# Patient Record
Sex: MALE | Race: WHITE | NOT HISPANIC OR LATINO | Employment: OTHER | URBAN - METROPOLITAN AREA
[De-identification: names, ages, dates, MRNs, and addresses within clinical notes are randomized per-mention and may not be internally consistent; named-entity substitution may affect disease eponyms.]

---

## 2018-02-12 ENCOUNTER — CONVERSION ENCOUNTER (OUTPATIENT)
Dept: OTHER | Facility: HOSPITAL | Age: 83
End: 2018-02-12

## 2018-02-12 ENCOUNTER — OFFICE VISIT CONVERTED (OUTPATIENT)
Dept: CARDIOLOGY | Facility: CLINIC | Age: 83
End: 2018-02-12
Attending: SPECIALIST

## 2018-02-27 ENCOUNTER — OFFICE VISIT CONVERTED (OUTPATIENT)
Dept: CARDIOLOGY | Facility: CLINIC | Age: 83
End: 2018-02-27
Attending: SPECIALIST

## 2018-03-08 ENCOUNTER — CONVERSION ENCOUNTER (OUTPATIENT)
Dept: OTHER | Facility: HOSPITAL | Age: 83
End: 2018-03-08

## 2018-03-08 ENCOUNTER — OFFICE VISIT CONVERTED (OUTPATIENT)
Dept: CARDIOLOGY | Facility: CLINIC | Age: 83
End: 2018-03-08
Attending: SPECIALIST

## 2018-05-03 ENCOUNTER — CONVERSION ENCOUNTER (OUTPATIENT)
Dept: OTHER | Facility: HOSPITAL | Age: 83
End: 2018-05-03

## 2018-05-03 ENCOUNTER — OFFICE VISIT CONVERTED (OUTPATIENT)
Dept: CARDIOLOGY | Facility: CLINIC | Age: 83
End: 2018-05-03
Attending: SPECIALIST

## 2018-05-17 ENCOUNTER — OFFICE VISIT CONVERTED (OUTPATIENT)
Dept: CARDIOLOGY | Facility: CLINIC | Age: 83
End: 2018-05-17
Attending: SPECIALIST

## 2018-05-17 ENCOUNTER — CONVERSION ENCOUNTER (OUTPATIENT)
Dept: OTHER | Facility: HOSPITAL | Age: 83
End: 2018-05-17

## 2018-05-22 ENCOUNTER — OFFICE VISIT CONVERTED (OUTPATIENT)
Dept: GASTROENTEROLOGY | Facility: CLINIC | Age: 83
End: 2018-05-22
Attending: PHYSICIAN ASSISTANT

## 2018-08-27 ENCOUNTER — OFFICE VISIT CONVERTED (OUTPATIENT)
Dept: CARDIOLOGY | Facility: CLINIC | Age: 83
End: 2018-08-27
Attending: SPECIALIST

## 2018-10-12 ENCOUNTER — OFFICE VISIT CONVERTED (OUTPATIENT)
Dept: GASTROENTEROLOGY | Facility: CLINIC | Age: 83
End: 2018-10-12
Attending: PHYSICIAN ASSISTANT

## 2018-12-27 ENCOUNTER — OFFICE VISIT CONVERTED (OUTPATIENT)
Dept: CARDIOLOGY | Facility: CLINIC | Age: 83
End: 2018-12-27
Attending: SPECIALIST

## 2019-01-14 ENCOUNTER — OFFICE VISIT CONVERTED (OUTPATIENT)
Dept: GASTROENTEROLOGY | Facility: CLINIC | Age: 84
End: 2019-01-14
Attending: PHYSICIAN ASSISTANT

## 2019-02-21 ENCOUNTER — CONVERSION ENCOUNTER (OUTPATIENT)
Dept: OTHER | Facility: HOSPITAL | Age: 84
End: 2019-02-21

## 2019-02-21 ENCOUNTER — OFFICE VISIT CONVERTED (OUTPATIENT)
Dept: CARDIOLOGY | Facility: CLINIC | Age: 84
End: 2019-02-21
Attending: SPECIALIST

## 2019-05-20 ENCOUNTER — CONVERSION ENCOUNTER (OUTPATIENT)
Dept: OTHER | Facility: HOSPITAL | Age: 84
End: 2019-05-20

## 2019-05-20 ENCOUNTER — OFFICE VISIT CONVERTED (OUTPATIENT)
Dept: CARDIOLOGY | Facility: CLINIC | Age: 84
End: 2019-05-20
Attending: SPECIALIST

## 2019-07-16 ENCOUNTER — CONVERSION ENCOUNTER (OUTPATIENT)
Dept: CARDIOLOGY | Facility: CLINIC | Age: 84
End: 2019-07-16
Attending: PHYSICIAN ASSISTANT

## 2019-07-18 ENCOUNTER — HOSPITAL ENCOUNTER (OUTPATIENT)
Dept: CT IMAGING | Facility: HOSPITAL | Age: 84
Discharge: HOME | End: 2019-07-18
Attending: PHYSICIAN ASSISTANT

## 2019-11-04 ENCOUNTER — CONVERSION ENCOUNTER (OUTPATIENT)
Dept: OTHER | Facility: HOSPITAL | Age: 84
End: 2019-11-04

## 2019-11-04 ENCOUNTER — OFFICE VISIT CONVERTED (OUTPATIENT)
Dept: CARDIOLOGY | Facility: CLINIC | Age: 84
End: 2019-11-04
Attending: SPECIALIST

## 2020-05-04 ENCOUNTER — CONVERSION ENCOUNTER (OUTPATIENT)
Dept: CARDIOLOGY | Facility: CLINIC | Age: 85
End: 2020-05-04

## 2020-05-04 ENCOUNTER — OFFICE VISIT CONVERTED (OUTPATIENT)
Dept: CARDIOLOGY | Facility: CLINIC | Age: 85
End: 2020-05-04
Attending: SPECIALIST

## 2020-05-06 ENCOUNTER — CONVERSION ENCOUNTER (OUTPATIENT)
Dept: SURGERY | Facility: CLINIC | Age: 85
End: 2020-05-06

## 2020-05-06 ENCOUNTER — OFFICE VISIT CONVERTED (OUTPATIENT)
Dept: UROLOGY | Facility: CLINIC | Age: 85
End: 2020-05-06
Attending: UROLOGY

## 2020-05-11 ENCOUNTER — HOSPITAL ENCOUNTER (OUTPATIENT)
Dept: NUCLEAR MEDICINE | Facility: HOSPITAL | Age: 85
Discharge: HOME | End: 2020-05-11
Attending: UROLOGY

## 2020-05-21 ENCOUNTER — HOSPITAL ENCOUNTER (OUTPATIENT)
Dept: RADIATION ONCOLOGY | Facility: HOSPITAL | Age: 85
Setting detail: RECURRING SERIES
Discharge: HOME | End: 2020-08-19
Attending: RADIOLOGY

## 2020-08-21 ENCOUNTER — HOSPITAL ENCOUNTER (OUTPATIENT)
Dept: RADIATION ONCOLOGY | Facility: HOSPITAL | Age: 85
Setting detail: RECURRING SERIES
Discharge: STILL ADMITTED OR ADMINISTRATIVE | End: 2020-08-31
Attending: RADIOLOGY

## 2020-09-21 ENCOUNTER — OFFICE VISIT CONVERTED (OUTPATIENT)
Dept: CARDIOLOGY | Facility: CLINIC | Age: 85
End: 2020-09-21
Attending: SPECIALIST

## 2020-09-24 ENCOUNTER — CONVERSION ENCOUNTER (OUTPATIENT)
Dept: CARDIOLOGY | Facility: CLINIC | Age: 85
End: 2020-09-24
Attending: SPECIALIST

## 2020-10-21 ENCOUNTER — OFFICE VISIT CONVERTED (OUTPATIENT)
Dept: SURGERY | Facility: CLINIC | Age: 85
End: 2020-10-21
Attending: SURGERY

## 2020-10-26 ENCOUNTER — HOSPITAL ENCOUNTER (OUTPATIENT)
Dept: PREADMISSION TESTING | Facility: HOSPITAL | Age: 85
Discharge: HOME | End: 2020-10-26
Attending: SURGERY

## 2020-10-27 LAB — SARS-COV-2 RNA SPEC QL NAA+PROBE: NOT DETECTED

## 2020-10-30 ENCOUNTER — HOSPITAL ENCOUNTER (OUTPATIENT)
Dept: PERIOP | Facility: HOSPITAL | Age: 85
Setting detail: HOSPITAL OUTPATIENT SURGERY
Discharge: HOME | End: 2020-10-30
Attending: SURGERY

## 2020-10-30 LAB
ANION GAP SERPL CALC-SCNC: 21 MMOL/L (ref 8–19)
BASOPHILS # BLD AUTO: 0.03 10*3/UL (ref 0–0.2)
BASOPHILS NFR BLD AUTO: 0.3 % (ref 0–3)
BUN SERPL-MCNC: 64 MG/DL (ref 5–25)
BUN/CREAT SERPL: 10 {RATIO} (ref 6–20)
CALCIUM SERPL-MCNC: 9.8 MG/DL (ref 8.7–10.4)
CHLORIDE SERPL-SCNC: 94 MMOL/L (ref 99–111)
CONV ABS IMM GRAN: 0.07 10*3/UL (ref 0–0.2)
CONV ANISOCYTES: SLIGHT
CONV CO2: 26 MMOL/L (ref 22–32)
CONV HYPOCHROMIA IN BLOOD BY LIGHT MICROSCOPY: SLIGHT
CONV IMMATURE GRAN: 0.7 % (ref 0–1.8)
CREAT UR-MCNC: 6.56 MG/DL (ref 0.7–1.2)
DEPRECATED RDW RBC AUTO: 71 FL (ref 35.1–43.9)
EOSINOPHIL # BLD AUTO: 0.12 10*3/UL (ref 0–0.7)
EOSINOPHIL # BLD AUTO: 1.2 % (ref 0–7)
ERYTHROCYTE [DISTWIDTH] IN BLOOD BY AUTOMATED COUNT: 18.4 % (ref 11.6–14.4)
GFR SERPLBLD BASED ON 1.73 SQ M-ARVRAT: 7 ML/MIN/{1.73_M2}
GLUCOSE BLD-MCNC: 164 MG/DL (ref 70–99)
GLUCOSE SERPL-MCNC: 140 MG/DL (ref 70–99)
HCT VFR BLD AUTO: 33 % (ref 42–52)
HGB BLD-MCNC: 10.8 G/DL (ref 14–18)
LYMPHOCYTES # BLD AUTO: 1.01 10*3/UL (ref 1–5)
LYMPHOCYTES NFR BLD AUTO: 10.3 % (ref 20–45)
MACROCYTES BLD QL SMEAR: SLIGHT
MCH RBC QN AUTO: 35.1 PG (ref 27–31)
MCHC RBC AUTO-ENTMCNC: 32.7 G/DL (ref 33–37)
MCV RBC AUTO: 107.1 FL (ref 80–96)
MONOCYTES # BLD AUTO: 0.48 10*3/UL (ref 0.2–1.2)
MONOCYTES NFR BLD AUTO: 4.9 % (ref 3–10)
NEUTROPHILS # BLD AUTO: 8.07 10*3/UL (ref 2–8)
NEUTROPHILS NFR BLD AUTO: 82.6 % (ref 30–85)
NRBC CBCN: 0 % (ref 0–0.7)
OSMOLALITY SERPL CALC.SUM OF ELEC: 305 MOSM/KG (ref 273–304)
OVALOCYTES BLD QL SMEAR: SLIGHT
PLATELET # BLD AUTO: 240 10*3/UL (ref 130–400)
PMV BLD AUTO: 9.1 FL (ref 9.4–12.4)
POIKILOCYTOSIS BLD QL SMEAR: SLIGHT
POLYCHROMASIA BLD QL SMEAR: SLIGHT
POTASSIUM SERPL-SCNC: 4.4 MMOL/L (ref 3.5–5.3)
RBC # BLD AUTO: 3.08 10*6/UL (ref 4.7–6.1)
SODIUM SERPL-SCNC: 137 MMOL/L (ref 135–147)
WBC # BLD AUTO: 9.78 10*3/UL (ref 4.8–10.8)

## 2020-11-30 ENCOUNTER — OFFICE VISIT CONVERTED (OUTPATIENT)
Dept: UROLOGY | Facility: CLINIC | Age: 85
End: 2020-11-30
Attending: UROLOGY

## 2020-12-23 ENCOUNTER — HOSPITAL ENCOUNTER (OUTPATIENT)
Dept: RADIATION ONCOLOGY | Facility: HOSPITAL | Age: 85
Discharge: HOME | End: 2020-12-23
Attending: RADIOLOGY

## 2021-04-12 ENCOUNTER — CONVERSION ENCOUNTER (OUTPATIENT)
Dept: OTHER | Facility: HOSPITAL | Age: 86
End: 2021-04-12

## 2021-04-12 ENCOUNTER — OFFICE VISIT CONVERTED (OUTPATIENT)
Dept: CARDIOLOGY | Facility: CLINIC | Age: 86
End: 2021-04-12
Attending: SPECIALIST

## 2021-05-10 NOTE — PROCEDURES
Procedure Note      Patient Name: Cristhian Watts   Patient ID: [id number]   Sex: Male   YOB: 1935    Primary Care Provider: Dee NASCIMENTO   Referring Provider: Dee NASCIMENTO    Visit Date: September 24, 2020    Provider: Christian Ocasio MD   Location: [facility]   Location Address: [address] [place] [address]  [id number]   Location Phone: [phone]          FINAL REPORT   TRANSTHORACIC ECHOCARDIOGRAM REPORT    Diagnosis: Atrial Fibrillation and CHF (Congestive Heart Failure)   Height: 6'2 Weight: 213 B/P: 130/63 BSA: 2.24   Tech: W   MEASUREMENTS:  RVID (Diastole) : RVID. (NORMAL: 0.7 to 2.4 cm max)   LVID (Systole): 3 cm (Diastole): 4.4 cm . (NORMAL: 3.7 - 5.4 cm)   Posterior Wall Thickness (Diastole): 1.2 cm. (NORMAL: 0.8 - 1.1 cm)   Septal Thickness (Diastole): 1.2 cm. (NORMAL: 0.7 - 1.2 cm)   LAID (Systole): 4.3 cm. (NORMAL: 1.9 - 3.8 cm)   Aortic Root Diameter (Diastole): 3.1 cm. (NORMAL: 2.0 - 3.7 cm)   DOPPLER:  E/A ratio 2.2 (NORMAL 0.8-2.0)   DT: 171 msec (NORMAL 140-240 msec.)   IVRT 103 m/sec (NORMAL  m/sec.)   E/E': 14 (NORMAL <8 avg.)   COMMENTS:  The patient underwent 2-D, M-Mode, and Doppler examination, including pulse-wave, continuous-wave, and color-flow analysis; the study is technically adequate.   FINDINGS:  AORTIC VALVE: fibrocalcific.   MITRAL VALVE: fibrocalcific.   TRICUSPID VALVE: Normal.   PULMONIC VALVE: Not well visualized.   LEFT ATRIUM: Normal. No intracavitary masses or clots seen. LA volume index is 40 mL/m2.   AORTIC ROOT: Normal in size with adequate motion.   LEFT VENTRICLE: Normal left ventricular systolic function. Ejection fraction 60%.   RIGHT ATRIUM: Normal.   RIGHT VENTRICLE: Normal size and function.   PERICARDIUM: Unremarkable. No evidence of effusion.   INFERIOR VENA CAVA: Diameter is 1 cm.   DOPPLER: Doppler examination of the aortic, mitral, tricuspid, and pulmonary valves was  performed. Shows moderate mitral regurgitation and moderate tricuspid regurgitation. Estimated pulmonary artery systolic pressure by Doppler was around 43 mm.      CONCLUSION    1. Fibrocalcific mitral and aortic valves.  2. Normal left ventricle systolic function.  3. Moderate mitral regurgitation.  4. Moderate tricuspid regurgitation.       Christian Ocasio MD  DENISSE/wt                 Electronically Signed by: Tawny Feliz-, -Author on September 24, 2020 02:46:18 PM  Electronically Co-signed by: Christian Ocasio MD -Reviewer on September 28, 2020 09:25:36 AM

## 2021-05-10 NOTE — H&P
History and Physical      Patient Name: Cristhian Watts   Patient ID: [id number]   Sex: Male   YOB: 1935    Primary Care Provider: Dee NASCIMENTO   Referring Provider: Dave Escobedo MD    Visit Date: October 21, 2020    Provider: Justin Arthur MD   Location: [facility]   Location Address: [address] [place] [address]  [id number]   Location Phone: [phone]          Chief Complaint  · Outpatient History & Physical / Surgical Orders  · PD Cath consult      History Of Present Illness  Cristhian Watts is a 85 year old /White male who presents to the office today as a consult from Dave Escobedo MD. He presents today for evaluation for peritoneal dialysis catheter placement. The patient is a hemodialysis patient but he reports that he gets very tired and weak after hemodialysis and his nephrologist feels he might be a candidate for peritoneal dialysis. He reports he has had some beginning education on peritoneal dialysis at the [facility]. He has not had a lot of abdominal surgery; he just reports the only surgery he has had is a remote open appendectomy when he was about ten years ago. Otherwise, he is not having any other unexpected symptoms.       Past Medical History  Diabetes; Prostate cancer metastatic to bone; 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; SOB (shortness of breath); Thyroid disorder         Past Surgical History  Bone marrow biopsy; Colonoscopy; Cystoscopic hydrostatic distension of bladder with biopsy; EGD; Kidney biopsy         Medication List  allopurinol 100 mg oral tablet; amiodarone 200 mg oral tablet; calcium acetate 667 mg oral tablet; cetirizine 10 mg oral tablet; cyclosporine 100 mg oral capsule; diltiazem HCl 120 mg oral capsule,ext.rel 24h degradable; finasteride 5 mg oral tablet; furosemide 40 mg oral tablet; gabapentin 100 mg oral capsule; levothyroxine 100 mcg oral tablet; metolazone 5 mg oral tablet;  "metoprolol tartrate 25 mg oral tablet; montelukast 10 mg oral tablet; Multi Vitamin 9 mg iron/15 mL oral liquid; pantoprazole 40 mg oral tablet,delayed release (DR/EC); pravastatin 20 mg oral tablet; prednisone 2.5 mg oral tablet; Stool Softener 100 mg oral capsule; Symbicort 160-4.5 mcg/actuation inhalation HFA aerosol inhaler; Vitamin D3 5000 units oral         Allergy List  NO KNOWN DRUG ALLERGIES       Allergies Reconciled  Family Medical History  - No Family History of Colorectal Cancer         Social History  Alcohol (Current some day); Tobacco (Former)         Review of Systems  · Constitutional  o Denies  o : chills, excessive sweating, fatigue, fever, sycope/passing out, weight gain, weight loss  · Eyes  o Denies  o : changes in vision, blurry vision, double vision  · HENT  o Denies  o : loss of hearing, ringing in the ears, ear aches, sore throat, nasal congestion, sinus pain, nose bleeds, seasonal allergies  · Cardiovascular  o Denies  o : blood clots, swollen legs, anemia, easy burising or bleeding, transfusions  · Respiratory  o Denies  o : shortness of breath, dry cough, productive cough, pneumonia, COPD  · Gastrointestinal  o Denies  o : difficulty swallowing, reflux  · Genitourinary  o Denies  o : incontinence  · Neurologic  o Denies  o : headache, seizure, stroke, tremor, loss of balance, falls, dizziness/vertigo, difficulty with sleep, numbness/tingling/paresthesia , difficulty with coordination, difficulty with dexterity, weakness  · Musculoskeletal  o Denies  o : neck stiffness/pain, swollen lymph nodes, muscle aches, joint pain, weakness, spasms, sciatica, pain radiating in arm, pain radiating in leg, low back pain  · Endocrine  o Denies  o : diabetes, thyroid disorder  · Psychiatric  o Denies  o : anxiety, depression      Vitals  Date Time BP Position Site L\R Cuff Size HR RR TEMP (F) WT  HT  BMI kg/m2 BSA m2 O2 Sat FR L/min FiO2        10/21/2020 02:42 PM       14  176lbs 16oz 6'  2\" 22.73 " 2.05             Physical Examination  · Constitutional  o Appearance  o : well developed, well-nourished, alert and in no acute distress  · Head and Face  o Head  o :   § Inspection  § : no deformities or lesions  · Eyes  o Conjunctivae  o : clear  o Sclerae  o : clear  · Neck  o Inspection/Palpation  o : normal appearance, no masses or tenderness, trachea midline  · Respiratory  o Respiratory Effort  o : breathing unlabored  o Inspection of Chest  o : normal appearance, no retractions  · Cardiovascular  o Heart  o : regular rate and rhythm  · Gastrointestinal  o Abdominal Examination  o : abdomen is soft.  · Lymphatic  o Neck  o : no lymphadenopathy present  o Axilla  o : no lymphadenopathy present  o Groin  o : no lymphadenopathy present  · Skin and Subcutaneous Tissue  o General Inspection  o : no rashes present, no lesions present, no areas of discoloration  · Neurologic  o Cranial Nerves  o : grossly intact  o Sensation  o : grossly intact  o Gait and Station  o :   § Gait Screening  § : normal gait, able to stand without diffculty  o Cerebellar Function  o : no obvious abnormalities  · Psychiatric  o Judgement and Insight  o : judgment and insight intact  o Mood and Affect  o : mood normal, affect appropriate          Assessment  · Pre-Surgical Orders     V72.84  · Pre-op testing     V72.84/Z01.818  · End stage renal disease     585.6/N18.6       Patient who presents for evaluation for peritoneal dialysis.       Plan  · Orders  o General Surgery Order (GENOR) - V72.84, 585.6/N18.6 - 10/30/2020  o [facility] [place] Covid-19 Screening (87782) - V72.84/Z01.818 - 10/26/2020   [facility] drive thru on 10/26/20 at 245PM at [address]  · Medications  o Medications have been Reconciled  o Transition of Care or Provider Policy  · Instructions  o PLAN:   o Handouts Provided-Pre-Procedure Instructions including date and time and location of procedure.  o Surgical Facility: [facility]  o ****Patient  Status****  o Outpatient  o ********************  o RISK AND BENEFITS:  o Consent for surgery: Given these options, the patient has verbally expressed an understanding of the risks of surgery and finds these risks acceptable. We will proceed with surgery as soon as possible.  o Consult Anesthesia for any post-operative block, or any pain management procedure deemed necessary by the anestesiologist for adequate post-operative pain control.   o O.R. PREP: Per protocol  o IV: Per Anesthesia  o PLEASE SIGN PERMIT FOR: PERITONEAL DIALYSIS CATHETER PLACEMENT  o *__Kefzol 2 gram IV on call to OR.  o *___The above History and Physical Examination has been completed within 30 days of admission.  o Pre-Admission Testing Date: PAT PHONE: 10/26/20 at 1PM  o Electronically Identified Patient Education Materials Provided Electronically     We will attempt peritoneal dialysis catheter placement and see how he does with peritoneal dialysis. Risks, benefits, and alternatives were discussed with the patient extensively. All questions were answered. The patient voiced understanding and agrees to proceed with the above plan.             Electronically Signed by: Lenora Clark-, -Author on October 22, 2020 10:11:33 AM  Electronically Co-signed by: Justin Arthur MD -Reviewer on October 22, 2020 11:38:00 AM

## 2021-05-13 NOTE — PROGRESS NOTES
Progress Note      Patient Name: Cristhian Watts   Patient ID: [id number]   Sex: Male   YOB: 1935    Primary Care Provider: Dee NASCIMENTO   Referring Provider: Dee NASCIMENTO    Visit Date: May 4, 2020    Provider: Christian Ocasio MD   Location: [facility]   Location Address: [address] [place] [address]  [id number]   Location Phone: [phone]          Chief Complaint  · Atrial fibrillation  · Presence of permanent pacemaker  · Hypertension  · Prostate CA  · Bladder CA       History Of Present Illness  Cristhian Watts is an 84 year old /White male with a history of multiple problems; Paroxysmal atrial fibrillation; status post ablation; status post cardioversion; Presence of permanent pacemaker; presence of watchman device, not on anticoagulation because bleeds. Recently diagnosed to have prostate CA work up being done for that. No further palpitations. He feels better. No chest pain or palpitations. No PND or othopnea. Shortness of breath is stable.   Medications  Meds at present include: Levothyroxine 100 mcg qd; Pravastatin 20 mg qpm; Prednisone-10 mg qod; Cyclosporine 100 mg qam and 50 mg qpm; Furosemide 40 mg qam; Stool softener 100 mg bid; Carvedilol 25 mg bid; Iron Ferosul 325 mg 2 tablets qpm; Amiodarone 200 mg qd; Allopurinol 100 mg qam; ASA 81 mg bid; Cetirizine 10 mg qpm; Finasteride 5 mg qpm; Montelukast 10 mg qd; Tamsulosin 0.4 mg qpm; Pantoprazole Sod DR 40 mg qd; Prednisone 10 mg qod; Symbicort 2 puffs bid; Vit D3-5000 iu qam; Procrit 40,000 units sub Q-q week.   PAST MEDICAL HISTORY: positive for diabetes, hyperlipidemia and hypertension; positive for atrial fibrillation and permanent pacemaker.   FAMILY HISTORY: negative for diabetes and hypertension. Positive for heart disease.   PSYCHO/SOCIAL HISTORY: negative for depression; rarely drinks alcohol and previously quit smoking.       Review of  "Systems  · Cardiovascular  o Admits  o : shortness of breath   o Denies  o : chest pain or angina pectoris, swelling, palpitations  · Respiratory  o Denies  o : chronic or frequent cough,asthma or wheezing      Vitals  Date Time BP Position Site L\R Cuff Size HR RR TEMP (F) WT  HT  BMI kg/m2 BSA m2 O2 Sat HC       05/04/2020 12:42 /71 Sitting    67 - R   224lbs 2oz 6'  2\" 28.78 2.3     05/04/2020 12:42 /74 Sitting    65 - R   224lbs 2oz 6'  2\" 28.78 2.3           Physical Examination  · Constitutional  o Appearance  o : Alert and Oriented X3. The patient is obese.   · Respiratory  o Inspection of Chest  o : No chest wall deformities, moving equal  o Auscultation of Lungs  o : Good air entry with vesicular breath sounds  · Cardiovascular  o Heart  o :   § Auscultation of Heart  § : S1 and S2 are regular. No S3. No S4. No murmurs.  o Peripheral Vascular System  o :   § Extremities  § : Peripheral pulses were well felt. 1+ edema. No cyanosis.  · Gastrointestinal  o Abdominal Examination  o : No masses or tenderness noted.      Neurologic: mental status within normal limits. Alert and oriented x3.  Psychiatric: mood and effect appear normal.           Assessment     IMPRESSION/PLAN    1. Paroxysmal atrial fibrillation; status post ablation.  Presence of Watchman's device stable.   Continue current dose of Amiodarone and carvedilol.    2. Presence of permanent pacemaker. Pacemaker check with the  shows his pacemaker is functioning normally.     3. Hyperlipidemia. Continue current dose of Pravastatin.   4. Chronic kidney disease stage 3 stable being managed by Dr. Escobedo.   5. Chronic diastolic heart failure stable. Continue current dose of Lasix.  I asked him to take an extra 40 mg of Lasix for the next 3 days and see if this improves his pedal edema. I asked him to decrease his alt and fluid intake. Follow up with Dr. Escobedo for it.   6. Multiple myeloma managed by his PMD.  7. Essential " hypertension controlled. 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 Continue current dose of  Carvedilol 25 mg bid.      8. He will see me back in 6 months.      MD DENISSE Elena/wt       Plan  · Instructions  o This note was transcribed by Mya Feliz. DENISSE/wt  o The above service was transcribed by Mya Feliz on my behalf and I attest to the accuracy of the note. DENISSE            Electronically Signed by: Tawny Feliz-, -Author on May 11, 2020 03:22:18 PM  Electronically Co-signed by: Christian Ocasio MD -Reviewer on May 13, 2020 12:11:00 PM

## 2021-05-13 NOTE — PROGRESS NOTES
Progress Note      Patient Name: Cristhian Watts   Patient ID: [id number]   Sex: Male   YOB: 1935    Primary Care Provider: Dee NASCIMENTO   Referring Provider: Dee NASCIMENTO    Visit Date: May 6, 2020    Provider: Qian Astudillo MD   Location[facility]   Location Address: [address] [place] [address]  [id number]   Location Phone: [phone]          Chief Complaint  · f/u cysto TURBT      History Of Present Illness  The patient is a 84 year old /White male, who is a consultation from Dee NASCIMENTO, for evaluation of prostate biopsies done 04/21/2020.   The PSA at diagnosis was 132. The Alek's score is 4+5. The number of positive cores was unknown. The number of total cores was unknown.   Tumor was found in the at the bladder neck found during TURBT biopsies. Perineural invasion was not reported. Prostate volume by ultrasound, in grams, was not reported.   Prior imaging studies have been done. These include a CT scan and metastases were detected. There were lesions in the vertebral column and enlarged pelvic lymph nodes.        He had gross hematuria and difficulty emptying his bladder.  CT scan revealed a bladder mass and pelvic lymphadenopathy as well as two small vertebral sclerotic lesions.      I took him to the OR and a TURBT revealed a alek grade 9 prostate cancer.        ---------LUPRON INJECTION INFORMATION---------  Cristhian Watts will receive a Lupron Injection today.   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; SOB (shortness of breath); Thyroid disorder         Past Surgical History  Bone marrow biopsy; Colonoscopy; Cystoscopic hydrostatic distension of bladder with biopsy; EGD; Kidney biopsy         Medication List  allopurinol 300 mg oral tablet; amiodarone 200 mg oral tablet; carvedilol 12.5 mg oral  "tablet; carvedilol 25 mg oral tablet; cetirizine 10 mg oral tablet; cyclosporine 100 mg oral capsule; finasteride 5 mg oral tablet; furosemide 40 mg oral tablet; Iron (ferrous sulfate) 325 mg (65 mg iron) oral tablet; Lantus U-100 Insulin 100 unit/mL subcutaneous solution; levothyroxine 75 mcg oral tablet; montelukast 10 mg oral tablet; Novolog Flexpen U-100 Insulin 100 unit/mL subcutaneous insulin pen; pantoprazole 40 mg oral tablet,delayed release (DR/EC); pravastatin 10 mg oral tablet; prednisone 2.5 mg oral tablet; Procrit 40,000 unit/mL injection solution; Stool Softener 100 mg oral capsule; Symbicort 160-4.5 mcg/actuation inhalation HFA aerosol inhaler; Ventolin HFA 90 mcg/actuation inhalation HFA aerosol inhaler; Vitamin D3 5000 units oral         Allergy List  NO KNOWN DRUG ALLERGIES       Allergies Reconciled  Family Medical History  - No Family History of Colorectal Cancer         Social History  Alcohol (Current some day); Tobacco (Former)         Review of Systems  · Constitutional  o Denies  o : chills, fever  · Gastrointestinal  o Denies  o : nausea, vomiting      Vitals  Date Time BP Position Site L\R Cuff Size HR RR TEMP (F) WT  HT  BMI kg/m2 BSA m2 O2 Sat        05/06/2020 10:41 /58 Sitting      97.5 218lbs 0oz 6'  2\" 27.99 2.27           Physical Examination  · Constitutional  o Appearance  o : Well nourished, well developed patient in no acute distress. Ambulating without difficulty.  · Head and Face  o Head  o :   § Inspection  § : atraumatic, normocephalic  o Face  o :   § Inspection  § : no facial lesions  · Eyes  o Sclerae  o : sclerae white  · Ears, Nose, Mouth and Throat  o Ears  o :   § External Ears  § : appearance within normal limits, no lesions present  o Nose  o :   § External Nose  § : appearance normal  · Neck  o Inspection/Palpation  o : normal appearance, trachea midline  · Respiratory  o Respiratory Effort  o : breathing unlabored  · Gastrointestinal  o Abdominal " Examination  o : abdomen nontender to palpation, tone normal without rigidity or guarding, no masses present, abdominal contour scaphoid  · Skin and Subcutaneous Tissue  o General Inspection  o : No rashes, lesions or areas of discoloration present. Skin turgor is normal.  · Neurologic  o Mental Status Examination  o :   § Orientation  § : grossly oriented to person, place and time  § Speech/Language  § : communication ability within normal limits  o Gait and Station  o : normal gait, able to stand without difficulty  · Psychiatric  o Judgement and Insight  o : judgment and insight intact, judgement for everyday activities and social situations within normal limits, insight intact  o Mood and Affect  o : mood normal, affect appropriate          Results  · In-Office Procedures  o Lab procedure  § Automated dipstick urinalysis with microscopy (32427)   § Color Ur: Yellow   § Clarity Ur: Clear   § Glucose Ur Ql Strip: Negative   § Bilirub Ur Ql Strip: Negative   § Ketones Ur Ql Strip: Negative   § Sp Gr Ur Qn: 1.015   § Hgb Ur Ql Strip: Large   § pH Ur-LsCnc: 5.5   § Prot Ur Ql Strip: 30   § Urobilinogen Ur Strip-mCnc: 0.2   § Nitrite Ur Ql Strip: Negative   § WBC Est Ur Ql Strip: Small   § RBC UrnS Qn HPF: tntc   § WBC UrnS Qn HPF: 0   § Bacteria UrnS Qn HPF: 0   § Crystals UrnS Qn HPF: 0   § Epithelial Cells (non renal): 0 /HPF  § Epithelial Cells (renal): 0       Assessment  · Prostate Cancer     185/C61  · 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       Malignant neoplasm of prostate     185/C61  · Prostate cancer metastatic to bone       Malignant neoplasm of prostate     185/C61  Secondary malignant neoplasm of bone     185/C79.51      Plan  · Orders  o Bone Scan (Whole Body) (96382) - 185/C61 - 05/11/2020  o Lupron 45 mg [facility] (-3) - 185/C61 - 05/06/2020   Injection - Lupron 45 mg; Dose: 45mg; Site: Not Entered; Route: intramuscular; Date: 05/06/2020 12:04:28; Exp: 06/01/2022; Lot: [id number]; Mfg: [employer]; TradeName: Lupron Depot (6 Month); Location: [facility]; Administered By: Valorie Flowers; Comment:   o Administration of hormonal anti-neoplastic chemotherapy (59099) - 185/C61 - 05/06/2020  · Medications  o Medications have been Reconciled  o Transition of Care or Provider Policy  · Instructions  o DISCUSSION:  o The patient has prostatic cancer by recent biopsy. I have had a long discussion regarding the options including observation, surgery, radiation, cryotherapy and hormonal therapies. Risks and benefits of each were explained fully and questions were answered. Due to his advanced age and advanced disease, he will need hormone therapy. A Lupron shot was given today. He will have an appt with Dr. Thakkar and Dr. Adrian. He is not a candidate for surgery.   o I recommend hormone therapy as his best option. The risks of hot flashes and fatigue were discussed. He understands that hormone therapy does not cure the cancer, but it usually slows its growth.  o FOLLOW-UP: by phone in one month  o Electronically Identified Patient Education Materials Provided Electronically            Electronically Signed by: Qian Astudillo MD -Author on May 6, 2020 12:26:34 PM

## 2021-05-13 NOTE — PROGRESS NOTES
Progress Note      Patient Name: Cristhian Watts   Patient ID: [id number]   Sex: Male   YOB: 1935    Primary Care Provider: Dee NASCIMENTO   Referring Provider: Dee NASCIMENTO    Visit Date: September 21, 2020    Provider: Christian Ocasio MD   Location: [facility]   Location Address: [address] [place] [address]  [id number]   Location Phone: [phone]          Chief Complaint  · Atrial fibrillation  · Presence of permanent pacemaker  · Hypertension  · Prostate CA  · Bladder CA       History Of Present Illness  Cristhian Watts is an 85 year old /White male with a history of multiple problems; Paroxysmal atrial fibrillation; status post ablation. He was recently in the hospital with increased heart rate has been started on Cardizem. No chest pain or palpitations. No PND or othopnea. Shortness of breath is stable.   Medications  Meds at present include: Levothyroxine 100 mcg qd; Pravastatin 20 mg qpm; Prednisone-10 mg qod; Cyclosporine 100 mg qam and 50 mg qpm; Furosemide 40 mg 2 tablets bid; Stool softener 100 mg bid; Amiodarone 200 mg bid; Allopurinol 100 mg qam; Cetirizine 10 mg qpm; Finasteride 5 mg qpm; Montelukast 10 mg qd; Pantoprazole Sod DR 40 mg qd; Prednisone 10 mg qod; Symbicort 2 puffs bid; Vit D3-5000 iu qam; Diltiazem 24 HR ER  mg qam; Gabapentin 100 mg qhs; Zaroxolyn 5 mg qd; Metoprolol Tartrate 25 mg 1/2 tablet bid; No insulin under 200.   PAST MEDICAL HISTORY: positive for diabetes, hyperlipidemia and hypertension; positive for atrial fibrillation and permanent pacemaker.   FAMILY HISTORY: negative for diabetes, hypertension and heart disease.   PSYCHO/SOCIAL HISTORY: negative for depression; rarely drinks alcohol and previously quit smoking.       Review of Systems  · Cardiovascular  o Admits  o : shortness of breath   o Denies  o : chest pain or angina pectoris, swelling,  "palpitations  · Respiratory  o Denies  o : chronic or frequent cough,asthma or wheezing      Vitals  Date Time BP Position Site L\R Cuff Size HR RR TEMP (F) WT  HT  BMI kg/m2 BSA m2 O2 Sat HC       09/21/2020 01:32 /63 Sitting    68 - R   213lbs 8oz 6'  2\" 27.41 2.25           Physical Examination  · Constitutional  o Appearance  o : Alert and Oriented X3. The patient is obese.   · Respiratory  o Inspection of Chest  o : No chest wall deformities, moving equal  o Auscultation of Lungs  o : Good air entry with vesicular breath sounds  · Cardiovascular  o Heart  o :   § Auscultation of Heart  § : S1 and S2 are regular. No S3. No S4. No murmurs.  o Peripheral Vascular System  o :   § Extremities  § : Peripheral pulses were well felt. 1+ edema. No cyanosis.  · Gastrointestinal  o Abdominal Examination  o : No masses or tenderness noted.      I reviewed his EKG and other reports from [facility]           Assessment     IMPRESSION/PLAN    1. Paroxysmal atrial fibrillation; status post ablation. I agree with starting Cardizem.  Continue current dose of Amiodarone and Carvedilol.    2. Presence of permanent pacemaker. Pacemaker  was checked with the  recently shows his pacemaker is functioning normally.   Repeat echocardiogram to evaluate left ventricle systolic function.   3. Hyperlipidemia. Continue current dose of Pravastatin.   4. Chronic kidney disease stage 3 stable being managed by Dr. Escobedo.   5. Essential hypertension controlled. 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 Continue current dose of Metoprolol.       6. He will see me back in 6 months.      Christian Ocasio MD  DENISSE/wt       Plan  · Medications  o Medications have been Reconciled  o Transition of Care or Provider Policy  · Instructions  o This note was transcribed by Mya Feliz. DENISSE/wt  o The above service was transcribed by Mya Feliz on my behalf and I attest to the accuracy of the note. " DENISSE            Electronically Signed by: Tawny Feliz-, -Author on September 25, 2020 07:50:30 AM  Electronically Co-signed by: Christian Ocasio MD -Reviewer on September 28, 2020 09:25:12 AM

## 2021-05-13 NOTE — PROGRESS NOTES
Progress Note      Patient Name: Cristhian Watts   Patient ID: [id number]   Sex: Male   YOB: 1935    Primary Care Provider: Dee NASCIMENTO   Referring Provider: Dave Escobedo MD    Visit Date: November 30, 2020    Provider: Qian Astudillo MD   Location: [facility]   Location Address: [address] [place] [address]  [id number]   Location Phone: [phone]          Chief Complaint  · pt is here for urological concerns      History Of Present Illness  The patient is a 85 year old /White male, who is a consultation from Dee NASCIMENTO, for evaluation of prostate biopsies done 04/21/2020.   The PSA at diagnosis was 132. The Alek's score is 4+5. The number of positive cores was unknown. The number of total cores was unknown.   Tumor was found in the at the bladder neck found during TURBT biopsies. Perineural invasion was not reported. Prostate volume by ultrasound, in grams, was not reported.   Prior imaging studies have been done. These include a CT scan and metastases were detected. There were lesions in the vertebral column and enlarged pelvic lymph nodes.        He had gross hematuria and difficulty emptying his bladder.  CT scan revealed a bladder mass and pelvic lymphadenopathy as well as two small vertebral sclerotic lesions.      I took him to the OR and a TURBT revealed a alek grade 9 prostate cancer.     He has been on hormone therapy and is having no issues.        ---------LUPRON INJECTION INFORMATION---------  Cristhian Watts will receive a Lupron Injection today.   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; SOB (shortness of breath); Thyroid disorder         Past Surgical History  Bone marrow biopsy; Colonoscopy; Cystoscopic hydrostatic distension of bladder with biopsy; EGD; Kidney biopsy  "        Medication List  allopurinol 100 mg oral tablet; amiodarone 200 mg oral tablet; calcium acetate 667 mg oral tablet; cetirizine 10 mg oral tablet; cyclosporine 100 mg oral capsule; diltiazem HCl 120 mg oral capsule,ext.rel 24h degradable; finasteride 5 mg oral tablet; furosemide 40 mg oral tablet; gabapentin 100 mg oral capsule; levothyroxine 100 mcg oral tablet; Lupron Depot (6 Month) 45 mg intramuscular syringe kit; metolazone 5 mg oral tablet; metoprolol tartrate 25 mg oral tablet; montelukast 10 mg oral tablet; Multi Vitamin 9 mg iron/15 mL oral liquid; pantoprazole 40 mg oral tablet,delayed release (DR/EC); pravastatin 20 mg oral tablet; prednisone 2.5 mg oral tablet; Stool Softener 100 mg oral capsule; Symbicort 160-4.5 mcg/actuation inhalation HFA aerosol inhaler; Vitamin D3 5000 units oral         Allergy List  NO KNOWN DRUG ALLERGIES       Allergies Reconciled  Family Medical History  - No Family History of Colorectal Cancer         Social History  Alcohol (Current some day); Tobacco (Former)         Review of Systems  · Constitutional  o Denies  o : chills, fever  · Gastrointestinal  o Denies  o : nausea, vomiting      Vitals  Date Time BP Position Site L\R Cuff Size HR RR TEMP (F) WT  HT  BMI kg/m2 BSA m2 O2 Sat FR L/min FiO2        11/30/2020 11:47 /51 Sitting       229lbs 0oz 6'  2\" 29.4 2.33             Physical Examination  · Constitutional  o Appearance  o : Well nourished, well developed patient in no acute distress. Ambulating without difficulty.  · Head and Face  o Head  o :   § Inspection  § : atraumatic, normocephalic  o Face  o :   § Inspection  § : no facial lesions  · Eyes  o Sclerae  o : sclerae white  · Ears, Nose, Mouth and Throat  o Ears  o :   § External Ears  § : appearance within normal limits, no lesions present  o Nose  o :   § External Nose  § : appearance normal  · Neck  o Inspection/Palpation  o : normal appearance, trachea midline  · Respiratory  o Respiratory " Effort  o : breathing unlabored  · Gastrointestinal  o Abdominal Examination  o : abdomen nontender to palpation, tone normal without rigidity or guarding, no masses present, abdominal contour scaphoid  · Neurologic  o Mental Status Examination  o :   § Orientation  § : grossly oriented to person, place and time  § Speech/Language  § : communication ability within normal limits  o Gait and Station  o : normal gait, able to stand without difficulty  · Psychiatric  o Judgement and Insight  o : judgment and insight intact, judgement for everyday activities and social situations within normal limits, insight intact  o Mood and Affect  o : mood normal, affect appropriate          Results  · In-Office Procedures  o Lab procedure  § Automated dipstick urinalysis with microscopy (88862)   § Color Ur: Yellow   § Clarity Ur: Clear   § Glucose Ur Ql Strip: Negative   § Bilirub Ur Ql Strip: Negative   § Ketones Ur Ql Strip: Negative   § Sp Gr Ur Qn: 1.020   § Hgb Ur Ql Strip: Trace-lysed   § pH Ur-LsCnc: 5.5   § Prot Ur Ql Strip: 100   § Urobilinogen Ur Strip-mCnc: 0.2   § Nitrite Ur Ql Strip: Negative   § WBC Est Ur Ql Strip: Trace   § RBC UrnS Qn HPF: 0   § WBC UrnS Qn HPF: 0   § Bacteria UrnS Qn HPF: 0   § Crystals UrnS Qn HPF: 0   § Epithelial Cells (non renal): 0 /HPF  § Epithelial Cells (renal): 0       Assessment  · Prostate Cancer     185/C61  · 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       Malignant neoplasm of prostate     185/C61  · Prostate cancer metastatic to bone       Malignant neoplasm of prostate     185/C61  Secondary malignant neoplasm of bone     185/C79.51      Plan  · Orders  o Administration of hormonal anti-neoplastic chemotherapy (49614) - 185/C61 - 11/30/2020  o 6.00 - Lupron 45 mg [facility] (-3) - 185/C61, 185/C79.51 - 11/30/2020   Lupron Lot No [id number], exp 03/20/2023  · Medications  o Medications have been Reconciled  o Transition of Care or Provider Policy  · Instructions  o DISCUSSION:  o The  patient has prostatic cancer by recent biopsy. I have had a long discussion regarding the options including observation, surgery, radiation, cryotherapy and hormonal therapies. Risks and benefits of each were explained fully and questions were answered. Due to his advanced age and advanced disease, he will need hormone therapy. A Lupron shot was given today. He will have an appt with Dr. Thakkar and Dr. Adrian. He is not a candidate for surgery.   o I recommend hormone therapy as his best option. The risks of hot flashes and fatigue were discussed. He understands that hormone therapy does not cure the cancer, but it usually slows its growth.  o FOLLOW-UP:  o In 6 months  o Please obtain labs for Lupron injection prior to the next office visit (injection).  o Electronically Identified Patient Education Materials Provided Electronically            Electronically Signed by: Qian Astudillo MD -Author on November 30, 2020 12:20:11 PM

## 2021-05-14 VITALS
WEIGHT: 239.12 LBS | SYSTOLIC BLOOD PRESSURE: 121 MMHG | DIASTOLIC BLOOD PRESSURE: 66 MMHG | HEIGHT: 74 IN | HEART RATE: 97 BPM | BODY MASS INDEX: 30.69 KG/M2

## 2021-05-14 VITALS — WEIGHT: 177 LBS | HEIGHT: 74 IN | BODY MASS INDEX: 22.72 KG/M2 | RESPIRATION RATE: 14 BRPM

## 2021-05-14 VITALS
BODY MASS INDEX: 27.4 KG/M2 | HEIGHT: 74 IN | DIASTOLIC BLOOD PRESSURE: 63 MMHG | SYSTOLIC BLOOD PRESSURE: 130 MMHG | WEIGHT: 213.5 LBS | HEART RATE: 68 BPM

## 2021-05-14 VITALS
WEIGHT: 229 LBS | HEIGHT: 74 IN | SYSTOLIC BLOOD PRESSURE: 135 MMHG | DIASTOLIC BLOOD PRESSURE: 51 MMHG | BODY MASS INDEX: 29.39 KG/M2

## 2021-05-14 NOTE — PROGRESS NOTES
Progress Note      Patient Name: Cristhian Watts   Patient ID: [id number]   Sex: Male   YOB: 1935    Primary Care Provider: Dee NASCIMENTO   Referring Provider: Dave Escobedo MD    Visit Date: April 12, 2021    Provider: Christian Ocasio MD   Location: [facility]   Location Address: [address] [place] [address]  [id number]   Location Phone: [phone]          Chief Complaint  · Atrial fibrillation  · Atrial fibrillation; history of ablation  · Presence of permanent pacemaker      History Of Present Illness  Cristhian Watts is an 85 year old /White male with a history of multiple medical problems; Paroxysmal atrial fibrillation; status post ablation. He was recently in the hospital with low blood pressures, Cardizem was decreased. Blood pressure is better now. No chest pain or palpitations. No PND or othopnea. Shortness of breath is stable.   Medications  Meds at present include: Levothyroxine 100 mcg qd; Calcitriol 0.5 mg in Amlodipine 3 times q week; Calcium Acetate 667 mg 2 capsules each meal and with snack; Montelukast 10 mg qd; Pantoprazole Sod DR 40 mg qd; Prednisone 10 mg qod; Symbicort 2 puffs bid; Vit D3-5000 iu qam; Diltiazem 24 HR ER  mg qam; Gabapentin 100 mg qhs; Metoprolol Tartrate 25 mg 1/2 tablet bid; No insulin under 200.   PAST MEDICAL HISTORY: positive for diabetes, hyperlipidemia and hypertension; positive for atrial fibrillation and permanent pacemaker.   FAMILY HISTORY: positive for diabetes and hypertension; Negative for heart disease.   PSYCHO/SOCIAL HISTORY: negative for depression; rarely drinks alcohol and previously quit smoking.       Review of Systems  · Cardiovascular  o Admits  o : shortness of breath   o Denies  o : chest pain or angina pectoris, swelling, palpitations  · Respiratory  o Denies  o : chronic or frequent cough,asthma or wheezing      Vitals  Date Time BP Position Site L\R Cuff Size  "HR RR TEMP (F) WT  HT  BMI kg/m2 BSA m2 O2 Sat FR L/min FiO2 HC       04/12/2021 11:05 /66 Sitting    97 - R   239lbs 2oz 6'  2\" 30.7 2.38             Physical Examination  · Constitutional  o Appearance  o : Alert and Oriented X3. The patient is obese.   · Respiratory  o Inspection of Chest  o : No chest wall deformities, moving equal  o Auscultation of Lungs  o : Good air entry with vesicular breath sounds  · Cardiovascular  o Heart  o :   § Auscultation of Heart  § : S1 and S2 are regular. No S3. No S4. No murmurs.  o Peripheral Vascular System  o :   § Extremities  § : Peripheral pulses were well felt. 1+ edema. No cyanosis.  · Gastrointestinal  o Abdominal Examination  o : No masses or tenderness noted.           Assessment     IMPRESSION/PLAN    1. Paroxysmal atrial fibrillation/atrial flutter; status post ablation. Continue Cardizem and Amiodarone. Continue current dose of lose dose Metoprolol.    2. Presence of permanent pacemaker. Pacemaker  was checked with the  shows his pacemaker is functioning normally, there is underlying atrial; flutter.     3. Chronic kidney disease stage 3 stable being managed by Dr. Escobedo.   4. Essential hypertension controlled.  Continue current dose of Metoprolol and Cardizem.   Monitor blood pressure regularly.     6. He will see me back in 6 months.      MD DENISSE Elena/wt       Plan  · Instructions  o This note was transcribed by Mya Feliz. DENISSE/wt  o The above service was transcribed by Mya Feliz on my behalf and I attest to the accuracy of the note. DENISSE            Electronically Signed by: Tawny Feliz-, -Author on April 15, 2021 10:25:09 AM  Electronically Co-signed by: Christian Ocasio MD -Reviewer on April 29, 2021 09:52:09 AM  "

## 2021-05-15 VITALS
DIASTOLIC BLOOD PRESSURE: 71 MMHG | HEIGHT: 74 IN | WEIGHT: 224.12 LBS | SYSTOLIC BLOOD PRESSURE: 151 MMHG | HEART RATE: 67 BPM | BODY MASS INDEX: 28.76 KG/M2

## 2021-05-15 VITALS
HEART RATE: 65 BPM | WEIGHT: 220 LBS | HEIGHT: 74 IN | BODY MASS INDEX: 28.23 KG/M2 | DIASTOLIC BLOOD PRESSURE: 62 MMHG | SYSTOLIC BLOOD PRESSURE: 173 MMHG

## 2021-05-15 VITALS
SYSTOLIC BLOOD PRESSURE: 126 MMHG | BODY MASS INDEX: 28.29 KG/M2 | OXYGEN SATURATION: 100 % | HEART RATE: 60 BPM | DIASTOLIC BLOOD PRESSURE: 57 MMHG | WEIGHT: 220.44 LBS | HEIGHT: 74 IN

## 2021-05-15 VITALS
HEART RATE: 68 BPM | HEIGHT: 74 IN | BODY MASS INDEX: 27.98 KG/M2 | WEIGHT: 218 LBS | DIASTOLIC BLOOD PRESSURE: 65 MMHG | OXYGEN SATURATION: 98 % | SYSTOLIC BLOOD PRESSURE: 149 MMHG

## 2021-05-15 VITALS
HEIGHT: 74 IN | WEIGHT: 220 LBS | DIASTOLIC BLOOD PRESSURE: 62 MMHG | HEART RATE: 65 BPM | BODY MASS INDEX: 28.23 KG/M2 | SYSTOLIC BLOOD PRESSURE: 173 MMHG

## 2021-05-15 VITALS
TEMPERATURE: 97.5 F | WEIGHT: 218 LBS | DIASTOLIC BLOOD PRESSURE: 58 MMHG | HEIGHT: 74 IN | SYSTOLIC BLOOD PRESSURE: 141 MMHG | BODY MASS INDEX: 27.98 KG/M2

## 2021-05-15 VITALS
HEIGHT: 74 IN | WEIGHT: 219.37 LBS | HEART RATE: 63 BPM | DIASTOLIC BLOOD PRESSURE: 67 MMHG | BODY MASS INDEX: 28.15 KG/M2 | SYSTOLIC BLOOD PRESSURE: 160 MMHG

## 2021-05-15 VITALS
DIASTOLIC BLOOD PRESSURE: 76 MMHG | HEIGHT: 74 IN | WEIGHT: 207.5 LBS | HEART RATE: 60 BPM | SYSTOLIC BLOOD PRESSURE: 147 MMHG | BODY MASS INDEX: 26.63 KG/M2

## 2021-05-15 VITALS
BODY MASS INDEX: 27.08 KG/M2 | SYSTOLIC BLOOD PRESSURE: 149 MMHG | HEIGHT: 74 IN | DIASTOLIC BLOOD PRESSURE: 68 MMHG | WEIGHT: 211 LBS | HEART RATE: 60 BPM

## 2021-05-16 VITALS
BODY MASS INDEX: 26.44 KG/M2 | HEIGHT: 74 IN | DIASTOLIC BLOOD PRESSURE: 69 MMHG | WEIGHT: 206 LBS | HEART RATE: 112 BPM | SYSTOLIC BLOOD PRESSURE: 120 MMHG

## 2021-05-16 VITALS
WEIGHT: 209.37 LBS | BODY MASS INDEX: 26.87 KG/M2 | DIASTOLIC BLOOD PRESSURE: 61 MMHG | SYSTOLIC BLOOD PRESSURE: 137 MMHG | HEIGHT: 74 IN

## 2021-05-16 VITALS
HEIGHT: 74 IN | SYSTOLIC BLOOD PRESSURE: 154 MMHG | HEART RATE: 64 BPM | WEIGHT: 211.12 LBS | BODY MASS INDEX: 27.09 KG/M2 | DIASTOLIC BLOOD PRESSURE: 69 MMHG

## 2021-05-16 VITALS
SYSTOLIC BLOOD PRESSURE: 99 MMHG | DIASTOLIC BLOOD PRESSURE: 56 MMHG | HEART RATE: 52 BPM | WEIGHT: 217.44 LBS | BODY MASS INDEX: 27.91 KG/M2 | HEIGHT: 74 IN

## 2021-05-16 VITALS
WEIGHT: 217.56 LBS | BODY MASS INDEX: 27.92 KG/M2 | RESPIRATION RATE: 12 BRPM | DIASTOLIC BLOOD PRESSURE: 59 MMHG | HEIGHT: 74 IN | SYSTOLIC BLOOD PRESSURE: 155 MMHG | OXYGEN SATURATION: 99 % | HEART RATE: 68 BPM

## 2021-05-16 VITALS
HEART RATE: 59 BPM | WEIGHT: 215 LBS | HEIGHT: 74 IN | SYSTOLIC BLOOD PRESSURE: 129 MMHG | BODY MASS INDEX: 27.59 KG/M2 | DIASTOLIC BLOOD PRESSURE: 64 MMHG

## 2021-05-16 VITALS
WEIGHT: 228 LBS | HEART RATE: 64 BPM | DIASTOLIC BLOOD PRESSURE: 56 MMHG | HEIGHT: 74 IN | SYSTOLIC BLOOD PRESSURE: 146 MMHG | BODY MASS INDEX: 29.26 KG/M2

## 2021-05-16 VITALS
BODY MASS INDEX: 28.11 KG/M2 | HEIGHT: 74 IN | DIASTOLIC BLOOD PRESSURE: 76 MMHG | HEART RATE: 64 BPM | SYSTOLIC BLOOD PRESSURE: 158 MMHG | WEIGHT: 219 LBS

## 2021-05-21 ENCOUNTER — OFFICE VISIT CONVERTED (OUTPATIENT)
Dept: UROLOGY | Facility: CLINIC | Age: 86
End: 2021-05-21
Attending: UROLOGY

## 2021-06-02 ENCOUNTER — TRANSCRIBE ORDERS (OUTPATIENT)
Dept: ADMINISTRATIVE | Facility: HOSPITAL | Age: 86
End: 2021-06-02

## 2021-06-02 DIAGNOSIS — C61 MALIGNANT NEOPLASM OF PROSTATE (HCC): ICD-10-CM

## 2021-06-02 DIAGNOSIS — C41.9 MALIGNANT NEOPLASM OF BONE, UNSPECIFIED LOCATION (HCC): Primary | ICD-10-CM

## 2021-06-05 NOTE — PROGRESS NOTES
Progress Note      Patient Name: Cristhian Watts   Patient ID: [id number]   Sex: Male   YOB: 1935    Primary Care Provider: Dee NASCIMENTO   Referring Provider: Dave Escobedo MD    Visit Date: May 21, 2021    Provider: Qian Astudillo MD   [facility]   Location Address: [address] [place] [address]  [id number]   Location Phone: [phone]          History Of Present Illness  The patient is a 85 year old /White male, who is a consultation from Dee NASCIMENTO, for evaluation of prostate biopsies done 04/21/2020.   The PSA at diagnosis was 132. The Binford's score is 4+5. The number of positive cores was unknown. The number of total cores was unknown.   Tumor was found in the at the bladder neck found during TURBT biopsies. Perineural invasion was not reported. Prostate volume by ultrasound, in grams, was not reported.   Prior imaging studies have been done. These include a CT scan and metastases were detected. There were lesions in the vertebral column and enlarged pelvic lymph nodes.        He had gross hematuria and difficulty emptying his bladder.  CT scan revealed a bladder mass and pelvic lymphadenopathy as well as two small vertebral sclerotic lesions.      I took him to the OR and a TURBT revealed a alek grade 9 prostate cancer.     He has been on hormone therapy and continues that.  He is also seeing oncology and radiation oncology.       ---------LUPRON INJECTION INFORMATION---------  Cristhian Watts will receive a Lupron Injection today.   -------------------------------------------------------      Allergy List    Allergies Reconciled  Physical Examination  · Constitutional  o Appearance  o : Well nourished, well developed patient in no acute distress. Ambulating without difficulty.  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 The risks of hot flashes and fatigue were discussed. He understands that hormone therapy does not cure the cancer, but it usually slows its growth.  o FOLLOW-UP:  o In 6 months  o Please obtain labs for Lupron injection prior to the next office visit (injection).            Electronically Signed by: Lenora Clark-, -Author on May 24, 2021 01:35:51 PM  Electronically Co-signed by: Qian Astudillo MD -Reviewer on June 1, 2021 08:13:00 AM

## 2021-06-14 ENCOUNTER — HOSPITAL ENCOUNTER (OUTPATIENT)
Dept: CT IMAGING | Facility: HOSPITAL | Age: 86
Discharge: HOME | End: 2021-06-14

## 2021-06-14 ENCOUNTER — APPOINTMENT (OUTPATIENT)
Dept: PET IMAGING | Facility: HOSPITAL | Age: 86
End: 2021-06-14

## 2021-06-14 ENCOUNTER — HOSPITAL ENCOUNTER (OUTPATIENT)
Dept: NUCLEAR MEDICINE | Facility: HOSPITAL | Age: 86
Discharge: HOME | End: 2021-06-14

## 2021-06-14 ENCOUNTER — TRANSCRIBE ORDERS (OUTPATIENT)
Dept: ADMINISTRATIVE | Facility: HOSPITAL | Age: 86
End: 2021-06-14

## 2021-06-14 ENCOUNTER — LAB (OUTPATIENT)
Dept: LAB | Facility: HOSPITAL | Age: 86
End: 2021-06-14

## 2021-06-14 DIAGNOSIS — C41.9 MALIGNANT NEOPLASM OF BONE, UNSPECIFIED LOCATION (HCC): ICD-10-CM

## 2021-06-14 DIAGNOSIS — J45.20 ASTHMA, MILD INTERMITTENT, POORLY CONTROLLED: Primary | ICD-10-CM

## 2021-06-14 DIAGNOSIS — C61 MALIGNANT NEOPLASM OF PROSTATE (HCC): ICD-10-CM

## 2021-06-14 DIAGNOSIS — Z12.5 ENCOUNTER FOR SCREENING FOR MALIGNANT NEOPLASM OF PROSTATE: ICD-10-CM

## 2021-06-14 DIAGNOSIS — J45.20 ASTHMA, MILD INTERMITTENT, POORLY CONTROLLED: ICD-10-CM

## 2021-06-14 PROCEDURE — A9503 TC99M MEDRONATE: HCPCS | Performed by: INTERNAL MEDICINE

## 2021-06-14 PROCEDURE — 78306 BONE IMAGING WHOLE BODY: CPT

## 2021-06-14 PROCEDURE — 0 TECHNETIUM MEDRONATE KIT: Performed by: INTERNAL MEDICINE

## 2021-06-14 PROCEDURE — 71250 CT THORAX DX C-: CPT

## 2021-06-14 PROCEDURE — 74176 CT ABD & PELVIS W/O CONTRAST: CPT

## 2021-06-14 RX ORDER — TC 99M MEDRONATE 20 MG/10ML
21.1 INJECTION, POWDER, LYOPHILIZED, FOR SOLUTION INTRAVENOUS
Status: COMPLETED | OUTPATIENT
Start: 2021-06-14 | End: 2021-06-14

## 2021-06-14 RX ADMIN — TC 99M MEDRONATE 21.1 MILLICURIE: 20 INJECTION, POWDER, LYOPHILIZED, FOR SOLUTION INTRAVENOUS at 11:17

## 2021-06-24 ENCOUNTER — APPOINTMENT (OUTPATIENT)
Dept: RADIATION ONCOLOGY | Facility: HOSPITAL | Age: 86
End: 2021-06-24

## 2021-07-07 ENCOUNTER — APPOINTMENT (OUTPATIENT)
Dept: RADIATION ONCOLOGY | Facility: HOSPITAL | Age: 86
End: 2021-07-07

## 2021-07-08 ENCOUNTER — OFFICE VISIT (OUTPATIENT)
Dept: RADIATION ONCOLOGY | Facility: HOSPITAL | Age: 86
End: 2021-07-08

## 2021-07-08 VITALS
BODY MASS INDEX: 29.65 KG/M2 | SYSTOLIC BLOOD PRESSURE: 118 MMHG | HEIGHT: 74 IN | TEMPERATURE: 97.3 F | RESPIRATION RATE: 16 BRPM | HEART RATE: 60 BPM | OXYGEN SATURATION: 97 % | DIASTOLIC BLOOD PRESSURE: 40 MMHG | WEIGHT: 231.04 LBS

## 2021-07-08 DIAGNOSIS — C61 PROSTATE CANCER METASTATIC TO MULTIPLE SITES (HCC): Primary | ICD-10-CM

## 2021-07-08 PROBLEM — Z23 ENCOUNTER FOR IMMUNIZATION: Status: ACTIVE | Noted: 2021-07-08

## 2021-07-08 PROBLEM — R06.02 SHORTNESS OF BREATH: Status: ACTIVE | Noted: 2017-02-06

## 2021-07-08 PROBLEM — I48.91 ATRIAL FIBRILLATION (HCC): Status: ACTIVE | Noted: 2018-02-14

## 2021-07-08 PROBLEM — J20.9 ACUTE BRONCHITIS: Status: ACTIVE | Noted: 2019-03-22

## 2021-07-08 PROBLEM — I50.9 CONGESTIVE HEART FAILURE (HCC): Status: ACTIVE | Noted: 2021-07-08

## 2021-07-08 PROBLEM — R97.20 ELEVATED PROSTATE SPECIFIC ANTIGEN (PSA): Status: ACTIVE | Noted: 2018-02-14

## 2021-07-08 PROBLEM — J90 PLEURAL EFFUSION DUE TO BACTERIAL INFECTION: Status: ACTIVE | Noted: 2021-07-08

## 2021-07-08 PROBLEM — M10.9 GOUT: Status: ACTIVE | Noted: 2021-07-08

## 2021-07-08 PROBLEM — E66.9 OBESITY: Status: ACTIVE | Noted: 2021-07-08

## 2021-07-08 PROBLEM — E11.9 TYPE 2 DIABETES MELLITUS WITHOUT COMPLICATION (HCC): Status: ACTIVE | Noted: 2018-07-25

## 2021-07-08 PROBLEM — E66.3 OVERWEIGHT WITH BODY MASS INDEX (BMI) 25.0-29.9: Status: ACTIVE | Noted: 2021-07-08

## 2021-07-08 PROBLEM — R09.82 POSTNASAL DRIP: Status: ACTIVE | Noted: 2021-07-08

## 2021-07-08 PROBLEM — E05.90 THYROTOXICOSIS: Status: ACTIVE | Noted: 2021-07-08

## 2021-07-08 PROBLEM — D64.9 ANEMIA: Status: ACTIVE | Noted: 2018-10-10

## 2021-07-08 PROBLEM — B96.89 PLEURAL EFFUSION DUE TO BACTERIAL INFECTION: Status: ACTIVE | Noted: 2021-07-08

## 2021-07-08 PROBLEM — M81.0 OSTEOPOROSIS: Status: ACTIVE | Noted: 2021-07-08

## 2021-07-08 PROBLEM — I11.9 BENIGN HYPERTENSIVE HEART DISEASE WITHOUT CONGESTIVE HEART FAILURE: Status: ACTIVE | Noted: 2021-07-08

## 2021-07-08 PROBLEM — K21.9 GASTROESOPHAGEAL REFLUX DISEASE: Status: ACTIVE | Noted: 2021-07-08

## 2021-07-08 PROBLEM — N18.6 END-STAGE RENAL DISEASE (HCC): Status: ACTIVE | Noted: 2020-06-10

## 2021-07-08 PROBLEM — J45.20 MILD INTERMITTENT ASTHMA: Status: ACTIVE | Noted: 2021-07-08

## 2021-07-08 PROBLEM — G47.33 OBSTRUCTIVE SLEEP APNEA: Status: ACTIVE | Noted: 2021-07-08

## 2021-07-08 PROBLEM — N04.1 NEPHROTIC SYNDROME WITH FOCAL AND SEGMENTAL GLOMERULAR LESIONS: Status: ACTIVE | Noted: 2019-05-08

## 2021-07-08 PROBLEM — Z87.891 EX-SMOKER: Status: ACTIVE | Noted: 2021-07-08

## 2021-07-08 PROCEDURE — G0463 HOSPITAL OUTPT CLINIC VISIT: HCPCS | Performed by: NURSE PRACTITIONER

## 2021-07-08 PROCEDURE — 99213 OFFICE O/P EST LOW 20 MIN: CPT | Performed by: NURSE PRACTITIONER

## 2021-07-08 RX ORDER — CETIRIZINE HYDROCHLORIDE 10 MG/1
10 TABLET ORAL DAILY
COMMUNITY

## 2021-07-08 RX ORDER — AMIODARONE HYDROCHLORIDE 200 MG/1
200 TABLET ORAL DAILY
COMMUNITY
Start: 2021-06-07

## 2021-07-08 RX ORDER — DILTIAZEM HYDROCHLORIDE 120 MG/1
120 CAPSULE, COATED, EXTENDED RELEASE ORAL DAILY
COMMUNITY
Start: 2021-06-21

## 2021-07-08 RX ORDER — PREDNISONE 10 MG/1
10 TABLET ORAL EVERY OTHER DAY
COMMUNITY
Start: 2021-06-07

## 2021-07-08 RX ORDER — ALLOPURINOL 300 MG/1
TABLET ORAL
COMMUNITY
End: 2021-07-08

## 2021-07-08 RX ORDER — BUDESONIDE AND FORMOTEROL FUMARATE DIHYDRATE 160; 4.5 UG/1; UG/1
2 AEROSOL RESPIRATORY (INHALATION)
COMMUNITY
Start: 2021-06-01

## 2021-07-08 RX ORDER — MONTELUKAST SODIUM 10 MG/1
10 TABLET ORAL DAILY
COMMUNITY
Start: 2021-06-28

## 2021-07-08 RX ORDER — FINASTERIDE 5 MG/1
5 TABLET, FILM COATED ORAL
COMMUNITY
Start: 2021-06-07

## 2021-07-08 RX ORDER — PANTOPRAZOLE SODIUM 40 MG/1
40 TABLET, DELAYED RELEASE ORAL DAILY
COMMUNITY
Start: 2021-05-05

## 2021-07-08 RX ORDER — POTASSIUM CHLORIDE 750 MG/1
10 CAPSULE, EXTENDED RELEASE ORAL 2 TIMES DAILY
COMMUNITY
Start: 2021-05-05

## 2021-07-08 RX ORDER — CALCIUM ACETATE 667 MG/1
1334 CAPSULE ORAL 3 TIMES DAILY
COMMUNITY
Start: 2021-05-05

## 2021-07-08 RX ORDER — ALBUTEROL SULFATE 90 UG/1
1 AEROSOL, METERED RESPIRATORY (INHALATION) EVERY 6 HOURS PRN
COMMUNITY

## 2021-07-08 RX ORDER — LEVOTHYROXINE SODIUM 0.15 MG/1
150 TABLET ORAL EVERY MORNING
Status: ON HOLD | COMMUNITY
Start: 2021-06-21 | End: 2021-08-01 | Stop reason: SDUPTHER

## 2021-07-08 RX ORDER — GABAPENTIN 100 MG/1
100 CAPSULE ORAL 2 TIMES DAILY
COMMUNITY
Start: 2021-05-24

## 2021-07-08 NOTE — PROGRESS NOTES
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 male who is here today to be seen for follow up of Radiation Therapy. Returns for routine follow up on Stage IV prostate cancer.  He is taking Lupron via Dr. CHRISTOS Thakkar. Has ESRD and receives dialysis. Reports generalized weakness and fatigue. Unable to walk from recliner to bathroom due to weakness.  Denies pain, weight loss, hematuria.        Subjective      Review of Systems: Review of Systems   Constitutional: Positive for fatigue. Negative for appetite change.   Respiratory: Positive for shortness of breath. Negative for cough.    Cardiovascular: Positive for leg swelling (occasionally).   Gastrointestinal: Negative for constipation and diarrhea.   Genitourinary: Positive for dysuria (reports as mild, ongoing since dx of prostate cancer). Negative for difficulty urinating, frequency and urgency.   Musculoskeletal: Positive for arthralgias, gait problem and myalgias.   Neurological: Positive for dizziness (occurs with hypotension). Negative for headaches.   Psychiatric/Behavioral: The patient is not nervous/anxious.        The following portions of the patient's history were reviewed and updated as appropriate: allergies, current medications, past family history, past medical history, past social history, past surgical history and problem list.    Medications:     Current Outpatient Medications:   •  albuterol sulfate HFA (Ventolin HFA) 108 (90 Base) MCG/ACT inhaler, Ventolin HFA 90 mcg/actuation inhalation HFA aerosol inhaler inhale 1 puff (90 mcg)  "by inhalation route every 6 hours as needed   Suspended, Disp: , Rfl:   •  amiodarone (PACERONE) 200 MG tablet, Take 200 mg by mouth Daily., Disp: , Rfl:   •  calcium acetate (PHOS BINDER,) 667 MG capsule capsule, TAKE TWO CAPSULES BY MOUTH WITH EACH MEAL AND TAKE ONE CAPSULE WITH A SNACK, Disp: , Rfl:   •  cetirizine (zyrTEC) 10 MG tablet, cetirizine 10 mg oral tablet take 1 tablet (10 mg) by oral route once daily   Active, Disp: , Rfl:   •  dilTIAZem CD (CARDIZEM CD) 120 MG 24 hr capsule, Take 120 mg by mouth Daily., Disp: , Rfl:   •  finasteride (PROSCAR) 5 MG tablet, Take 5 mg by mouth every night at bedtime., Disp: , Rfl:   •  gabapentin (NEURONTIN) 100 MG capsule, Take 100 mg by mouth 2 (Two) Times a Day., Disp: , Rfl:   •  levothyroxine (SYNTHROID, LEVOTHROID) 150 MCG tablet, Take 150 mcg by mouth Every Morning., Disp: , Rfl:   •  metoprolol tartrate (LOPRESSOR) 25 MG tablet, TAKE 1/2 TABLET (12.5MG) BY MOUTH TWICE DAILY, Disp: , Rfl:   •  montelukast (SINGULAIR) 10 MG tablet, Take 10 mg by mouth Daily., Disp: , Rfl:   •  pantoprazole (PROTONIX) 40 MG EC tablet, Take 40 mg by mouth Daily., Disp: , Rfl:   •  potassium chloride (MICRO-K) 10 MEQ CR capsule, Take 20 mEq by mouth Daily., Disp: , Rfl:   •  predniSONE (DELTASONE) 10 MG tablet, Take 10 mg by mouth Every Other Day., Disp: , Rfl:   •  Symbicort 160-4.5 MCG/ACT inhaler, , Disp: , Rfl:     Allergies:   Allergies   Allergen Reactions   • Renagel [Sevelamer] Unknown - Low Severity       ECOG (3) Capable of limited self-care, confined to bed or chair > 50% of waking hours    Objective     Physical Exam:   Vital Signs:   Vitals:    07/08/21 1458   BP: 118/40   Pulse: 60   Resp: 16   Temp: 97.3 °F (36.3 °C)   TempSrc: Temporal   SpO2: 97%   Weight: 105 kg (231 lb 0.7 oz)   Height: 188 cm (74\")   PainSc:   5     Body mass index is 29.66 kg/m².     Physical Exam  Vitals and nursing note reviewed.   Constitutional:       General: He is not in acute distress.     " Appearance: Normal appearance.   HENT:      Head: Normocephalic and atraumatic.      Mouth/Throat:      Mouth: Mucous membranes are moist.      Pharynx: Oropharynx is clear.   Eyes:      Extraocular Movements: Extraocular movements intact.      Conjunctiva/sclera: Conjunctivae normal.      Pupils: Pupils are equal, round, and reactive to light.   Cardiovascular:      Rate and Rhythm: Normal rate and regular rhythm.      Heart sounds: Normal heart sounds.   Pulmonary:      Effort: Pulmonary effort is normal.      Breath sounds: Normal breath sounds.   Musculoskeletal:         General: Normal range of motion.      Cervical back: Normal range of motion and neck supple.      Comments: Immobility due to generalized weakness    Skin:     General: Skin is warm and dry.   Neurological:      General: No focal deficit present.      Mental Status: He is alert and oriented to person, place, and time. Mental status is at baseline.   Psychiatric:         Mood and Affect: Mood normal.         Radiographs:   Pathology:   Labs: PSA from 6/16/21 was 0.45 per patient report    Assessment / Plan      Impressions: Cristhian Watts is a pleasant 85 y.o. male with history MM and Stage IV adenocarcinoma of the prostate with multiple asymptomatic osseous metastatic disease.      Assessment/Plan:   Diagnoses and all orders for this visit:    1. 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 Discussed Lupron as well as his chronic comorbidities can cause weakness and fatigue.  He is doing outpatient rehab and may go inpatient for rehab.    2. Will have him continue to follow Dr. CHRISTOS Thakkar for his prostate cancer, if any need for radiation oncology will return back to office.  He understands as does his wife to call for any concerns. Will have him return as needed   No follow-ups on file.      Sincerely,        Rosemary Roche, APRLUCA  [facility] Radiation Oncology   This document has been  signed by AZAM Giron on July 8, 2021 15:54 EDT

## 2021-07-26 ENCOUNTER — HOSPITAL ENCOUNTER (INPATIENT)
Facility: HOSPITAL | Age: 86
LOS: 6 days | Discharge: HOME | End: 2021-08-01
Attending: EMERGENCY MEDICINE | Admitting: INTERNAL MEDICINE

## 2021-07-26 ENCOUNTER — APPOINTMENT (OUTPATIENT)
Dept: GENERAL RADIOLOGY | Facility: HOSPITAL | Age: 86
End: 2021-07-26

## 2021-07-26 DIAGNOSIS — R26.2 DIFFICULTY IN WALKING: ICD-10-CM

## 2021-07-26 DIAGNOSIS — I21.4 NSTEMI (NON-ST ELEVATED MYOCARDIAL INFARCTION) (HCC): Primary | ICD-10-CM

## 2021-07-26 DIAGNOSIS — Z78.9 DECREASED ACTIVITIES OF DAILY LIVING (ADL): ICD-10-CM

## 2021-07-26 DIAGNOSIS — N18.5 CHRONIC RENAL FAILURE, STAGE 5 (HCC): ICD-10-CM

## 2021-07-26 DIAGNOSIS — R53.1 GENERALIZED WEAKNESS: ICD-10-CM

## 2021-07-26 PROBLEM — Z95.0 PRESENCE OF CARDIAC PACEMAKER: Status: ACTIVE | Noted: 2021-07-26

## 2021-07-26 PROBLEM — R77.8 TROPONIN LEVEL ELEVATED: Status: ACTIVE | Noted: 2021-07-26

## 2021-07-26 PROBLEM — I48.0 PAROXYSMAL ATRIAL FIBRILLATION (HCC): Status: ACTIVE | Noted: 2018-02-14

## 2021-07-26 LAB
ALBUMIN SERPL-MCNC: 3 G/DL (ref 3.5–5.2)
ALBUMIN/GLOB SERPL: 0.6 G/DL
ALP SERPL-CCNC: 297 U/L (ref 39–117)
ALT SERPL W P-5'-P-CCNC: 50 U/L (ref 1–41)
ANION GAP SERPL CALCULATED.3IONS-SCNC: 15 MMOL/L (ref 5–15)
ANION GAP SERPL CALCULATED.3IONS-SCNC: 16.3 MMOL/L (ref 5–15)
APTT PPP: 23 SECONDS (ref 22.2–34.2)
APTT PPP: 47.4 SECONDS (ref 22.2–34.2)
AST SERPL-CCNC: 104 U/L (ref 1–40)
BACTERIA UR QL AUTO: ABNORMAL /HPF
BASOPHILS # BLD AUTO: 0.04 10*3/MM3 (ref 0–0.2)
BASOPHILS # BLD AUTO: 0.09 10*3/MM3 (ref 0–0.2)
BASOPHILS NFR BLD AUTO: 0.3 % (ref 0–1.5)
BASOPHILS NFR BLD AUTO: 0.7 % (ref 0–1.5)
BILIRUB SERPL-MCNC: 0.6 MG/DL (ref 0–1.2)
BILIRUB UR QL STRIP: ABNORMAL
BUN SERPL-MCNC: 32 MG/DL (ref 8–23)
BUN SERPL-MCNC: 37 MG/DL (ref 8–23)
BUN/CREAT SERPL: 4.5 (ref 7–25)
BUN/CREAT SERPL: 4.8 (ref 7–25)
CALCIUM SPEC-SCNC: 9.1 MG/DL (ref 8.6–10.5)
CALCIUM SPEC-SCNC: 9.2 MG/DL (ref 8.6–10.5)
CHLORIDE SERPL-SCNC: 90 MMOL/L (ref 98–107)
CHLORIDE SERPL-SCNC: 92 MMOL/L (ref 98–107)
CHOLEST SERPL-MCNC: 363 MG/DL (ref 0–200)
CK SERPL-CCNC: 24 U/L (ref 20–200)
CLARITY UR: ABNORMAL
CO2 SERPL-SCNC: 23.7 MMOL/L (ref 22–29)
CO2 SERPL-SCNC: 24 MMOL/L (ref 22–29)
COLOR UR: ABNORMAL
CREAT SERPL-MCNC: 7.07 MG/DL (ref 0.76–1.27)
CREAT SERPL-MCNC: 7.64 MG/DL (ref 0.76–1.27)
DEPRECATED RDW RBC AUTO: 56.4 FL (ref 37–54)
DEPRECATED RDW RBC AUTO: 57.5 FL (ref 37–54)
EOSINOPHIL # BLD AUTO: 0.03 10*3/MM3 (ref 0–0.4)
EOSINOPHIL # BLD AUTO: 0.26 10*3/MM3 (ref 0–0.4)
EOSINOPHIL NFR BLD AUTO: 0.3 % (ref 0.3–6.2)
EOSINOPHIL NFR BLD AUTO: 2 % (ref 0.3–6.2)
ERYTHROCYTE [DISTWIDTH] IN BLOOD BY AUTOMATED COUNT: 14.7 % (ref 12.3–15.4)
ERYTHROCYTE [DISTWIDTH] IN BLOOD BY AUTOMATED COUNT: 15 % (ref 12.3–15.4)
GFR SERPL CREATININE-BSD FRML MDRD: 7 ML/MIN/1.73
GFR SERPL CREATININE-BSD FRML MDRD: 7 ML/MIN/1.73
GFR SERPL CREATININE-BSD FRML MDRD: ABNORMAL ML/MIN/{1.73_M2}
GFR SERPL CREATININE-BSD FRML MDRD: ABNORMAL ML/MIN/{1.73_M2}
GLOBULIN UR ELPH-MCNC: 4.7 GM/DL
GLUCOSE SERPL-MCNC: 146 MG/DL (ref 65–99)
GLUCOSE SERPL-MCNC: 177 MG/DL (ref 65–99)
GLUCOSE UR STRIP-MCNC: NEGATIVE MG/DL
HCT VFR BLD AUTO: 33.7 % (ref 37.5–51)
HCT VFR BLD AUTO: 36.6 % (ref 37.5–51)
HDLC SERPL-MCNC: 38 MG/DL (ref 40–60)
HGB BLD-MCNC: 11 G/DL (ref 13–17.7)
HGB BLD-MCNC: 12 G/DL (ref 13–17.7)
HGB UR QL STRIP.AUTO: NEGATIVE
HOLD SPECIMEN: NORMAL
HOLD SPECIMEN: NORMAL
IMM GRANULOCYTES # BLD AUTO: 0.13 10*3/MM3 (ref 0–0.05)
IMM GRANULOCYTES # BLD AUTO: 0.2 10*3/MM3 (ref 0–0.05)
IMM GRANULOCYTES NFR BLD AUTO: 1.1 % (ref 0–0.5)
IMM GRANULOCYTES NFR BLD AUTO: 1.6 % (ref 0–0.5)
INR PPP: 0.96 (ref 2–3)
KETONES UR QL STRIP: ABNORMAL
LDLC SERPL CALC-MCNC: 281 MG/DL (ref 0–100)
LDLC/HDLC SERPL: 7.45 {RATIO}
LEUKOCYTE ESTERASE UR QL STRIP.AUTO: ABNORMAL
LYMPHOCYTES # BLD AUTO: 1.4 10*3/MM3 (ref 0.7–3.1)
LYMPHOCYTES # BLD AUTO: 1.77 10*3/MM3 (ref 0.7–3.1)
LYMPHOCYTES NFR BLD AUTO: 10.9 % (ref 19.6–45.3)
LYMPHOCYTES NFR BLD AUTO: 15.4 % (ref 19.6–45.3)
MAGNESIUM SERPL-MCNC: 1.6 MG/DL (ref 1.6–2.4)
MCH RBC QN AUTO: 34 PG (ref 26.6–33)
MCH RBC QN AUTO: 34.2 PG (ref 26.6–33)
MCHC RBC AUTO-ENTMCNC: 32.6 G/DL (ref 31.5–35.7)
MCHC RBC AUTO-ENTMCNC: 32.8 G/DL (ref 31.5–35.7)
MCV RBC AUTO: 104 FL (ref 79–97)
MCV RBC AUTO: 104.3 FL (ref 79–97)
MONOCYTES # BLD AUTO: 0.61 10*3/MM3 (ref 0.1–0.9)
MONOCYTES # BLD AUTO: 0.65 10*3/MM3 (ref 0.1–0.9)
MONOCYTES NFR BLD AUTO: 5 % (ref 5–12)
MONOCYTES NFR BLD AUTO: 5.3 % (ref 5–12)
NEUTROPHILS NFR BLD AUTO: 10.29 10*3/MM3 (ref 1.7–7)
NEUTROPHILS NFR BLD AUTO: 77.6 % (ref 42.7–76)
NEUTROPHILS NFR BLD AUTO: 79.8 % (ref 42.7–76)
NEUTROPHILS NFR BLD AUTO: 8.88 10*3/MM3 (ref 1.7–7)
NITRITE UR QL STRIP: NEGATIVE
NRBC BLD AUTO-RTO: 0 /100 WBC (ref 0–0.2)
NRBC BLD AUTO-RTO: 0 /100 WBC (ref 0–0.2)
PH UR STRIP.AUTO: 5.5 [PH] (ref 5–8)
PLATELET # BLD AUTO: 261 10*3/MM3 (ref 140–450)
PLATELET # BLD AUTO: 317 10*3/MM3 (ref 140–450)
PMV BLD AUTO: 9 FL (ref 6–12)
PMV BLD AUTO: 9.2 FL (ref 6–12)
POTASSIUM SERPL-SCNC: 4.5 MMOL/L (ref 3.5–5.2)
POTASSIUM SERPL-SCNC: 5.3 MMOL/L (ref 3.5–5.2)
PROCALCITONIN SERPL-MCNC: 1.97 NG/ML (ref 0–0.25)
PROT SERPL-MCNC: 7.7 G/DL (ref 6–8.5)
PROT UR QL STRIP: ABNORMAL
PROTHROMBIN TIME: 10.6 SECONDS (ref 9.4–12)
QT INTERVAL: 510 MS
RBC # BLD AUTO: 3.24 10*6/MM3 (ref 4.14–5.8)
RBC # BLD AUTO: 3.51 10*6/MM3 (ref 4.14–5.8)
RBC # UR: ABNORMAL /HPF
REF LAB TEST METHOD: ABNORMAL
SODIUM SERPL-SCNC: 129 MMOL/L (ref 136–145)
SODIUM SERPL-SCNC: 132 MMOL/L (ref 136–145)
SP GR UR STRIP: 1.02 (ref 1–1.03)
SQUAMOUS #/AREA URNS HPF: ABNORMAL /HPF
TRIGL SERPL-MCNC: 209 MG/DL (ref 0–150)
TROPONIN T SERPL-MCNC: 0.26 NG/ML (ref 0–0.03)
TROPONIN T SERPL-MCNC: 0.3 NG/ML (ref 0–0.03)
TROPONIN T SERPL-MCNC: 0.3 NG/ML (ref 0–0.03)
TSH SERPL DL<=0.05 MIU/L-ACNC: 25.45 UIU/ML (ref 0.27–4.2)
UROBILINOGEN UR QL STRIP: ABNORMAL
VLDLC SERPL-MCNC: 44 MG/DL (ref 5–40)
WBC # BLD AUTO: 11.46 10*3/MM3 (ref 3.4–10.8)
WBC # BLD AUTO: 12.89 10*3/MM3 (ref 3.4–10.8)
WBC UR QL AUTO: ABNORMAL /HPF
WHOLE BLOOD HOLD SPECIMEN: NORMAL

## 2021-07-26 PROCEDURE — 84484 ASSAY OF TROPONIN QUANT: CPT

## 2021-07-26 PROCEDURE — 85730 THROMBOPLASTIN TIME PARTIAL: CPT | Performed by: INTERNAL MEDICINE

## 2021-07-26 PROCEDURE — 36415 COLL VENOUS BLD VENIPUNCTURE: CPT | Performed by: INTERNAL MEDICINE

## 2021-07-26 PROCEDURE — 25010000002 HYDROMORPHONE 1 MG/ML SOLUTION: Performed by: INTERNAL MEDICINE

## 2021-07-26 PROCEDURE — 87040 BLOOD CULTURE FOR BACTERIA: CPT | Performed by: INTERNAL MEDICINE

## 2021-07-26 PROCEDURE — 71045 X-RAY EXAM CHEST 1 VIEW: CPT

## 2021-07-26 PROCEDURE — 83735 ASSAY OF MAGNESIUM: CPT

## 2021-07-26 PROCEDURE — 84443 ASSAY THYROID STIM HORMONE: CPT | Performed by: INTERNAL MEDICINE

## 2021-07-26 PROCEDURE — 85025 COMPLETE CBC W/AUTO DIFF WBC: CPT | Performed by: INTERNAL MEDICINE

## 2021-07-26 PROCEDURE — 99284 EMERGENCY DEPT VISIT MOD MDM: CPT

## 2021-07-26 PROCEDURE — 80053 COMPREHEN METABOLIC PANEL: CPT

## 2021-07-26 PROCEDURE — 93005 ELECTROCARDIOGRAM TRACING: CPT | Performed by: EMERGENCY MEDICINE

## 2021-07-26 PROCEDURE — 85610 PROTHROMBIN TIME: CPT | Performed by: EMERGENCY MEDICINE

## 2021-07-26 PROCEDURE — 25010000002 HEPARIN (PORCINE) PER 1000 UNITS: Performed by: EMERGENCY MEDICINE

## 2021-07-26 PROCEDURE — 81001 URINALYSIS AUTO W/SCOPE: CPT | Performed by: EMERGENCY MEDICINE

## 2021-07-26 PROCEDURE — 3E1M39Z IRRIGATION OF PERITONEAL CAVITY USING DIALYSATE, PERCUTANEOUS APPROACH: ICD-10-PCS | Performed by: INTERNAL MEDICINE

## 2021-07-26 PROCEDURE — 99222 1ST HOSP IP/OBS MODERATE 55: CPT | Performed by: INTERNAL MEDICINE

## 2021-07-26 PROCEDURE — 93010 ELECTROCARDIOGRAM REPORT: CPT | Performed by: INTERNAL MEDICINE

## 2021-07-26 PROCEDURE — 84484 ASSAY OF TROPONIN QUANT: CPT | Performed by: INTERNAL MEDICINE

## 2021-07-26 PROCEDURE — 80061 LIPID PANEL: CPT | Performed by: INTERNAL MEDICINE

## 2021-07-26 PROCEDURE — 85025 COMPLETE CBC W/AUTO DIFF WBC: CPT

## 2021-07-26 PROCEDURE — 85730 THROMBOPLASTIN TIME PARTIAL: CPT | Performed by: EMERGENCY MEDICINE

## 2021-07-26 PROCEDURE — 93005 ELECTROCARDIOGRAM TRACING: CPT

## 2021-07-26 PROCEDURE — 84145 PROCALCITONIN (PCT): CPT | Performed by: INTERNAL MEDICINE

## 2021-07-26 PROCEDURE — 82550 ASSAY OF CK (CPK): CPT | Performed by: INTERNAL MEDICINE

## 2021-07-26 RX ORDER — HYDROCODONE BITARTRATE AND ACETAMINOPHEN 7.5; 325 MG/1; MG/1
2 TABLET ORAL EVERY 4 HOURS PRN
Status: DISCONTINUED | OUTPATIENT
Start: 2021-07-26 | End: 2021-08-01 | Stop reason: HOSPADM

## 2021-07-26 RX ORDER — NALOXONE HCL 0.4 MG/ML
0.4 VIAL (ML) INJECTION
Status: DISCONTINUED | OUTPATIENT
Start: 2021-07-26 | End: 2021-08-01 | Stop reason: HOSPADM

## 2021-07-26 RX ORDER — CALCITRIOL 0.5 UG/1
0.5 CAPSULE, LIQUID FILLED ORAL DAILY
COMMUNITY

## 2021-07-26 RX ORDER — DOCUSATE SODIUM 100 MG/1
100 CAPSULE, LIQUID FILLED ORAL DAILY
COMMUNITY

## 2021-07-26 RX ORDER — BISACODYL 10 MG
10 SUPPOSITORY, RECTAL RECTAL DAILY PRN
Status: DISCONTINUED | OUTPATIENT
Start: 2021-07-26 | End: 2021-08-01 | Stop reason: HOSPADM

## 2021-07-26 RX ORDER — HEPARIN SODIUM 10000 [USP'U]/100ML
11.8 INJECTION, SOLUTION INTRAVENOUS
Status: DISCONTINUED | OUTPATIENT
Start: 2021-07-26 | End: 2021-07-27

## 2021-07-26 RX ORDER — SODIUM CHLORIDE 0.9 % (FLUSH) 0.9 %
10 SYRINGE (ML) INJECTION EVERY 12 HOURS SCHEDULED
Status: DISCONTINUED | OUTPATIENT
Start: 2021-07-26 | End: 2021-08-01 | Stop reason: HOSPADM

## 2021-07-26 RX ORDER — POLYETHYLENE GLYCOL 3350 17 G/17G
17 POWDER, FOR SOLUTION ORAL DAILY PRN
Status: DISCONTINUED | OUTPATIENT
Start: 2021-07-26 | End: 2021-08-01 | Stop reason: HOSPADM

## 2021-07-26 RX ORDER — SODIUM CHLORIDE 0.9 % (FLUSH) 0.9 %
10 SYRINGE (ML) INJECTION AS NEEDED
Status: DISCONTINUED | OUTPATIENT
Start: 2021-07-26 | End: 2021-08-01 | Stop reason: HOSPADM

## 2021-07-26 RX ORDER — ONDANSETRON 2 MG/ML
4 INJECTION INTRAMUSCULAR; INTRAVENOUS EVERY 6 HOURS PRN
Status: DISCONTINUED | OUTPATIENT
Start: 2021-07-26 | End: 2021-08-01 | Stop reason: HOSPADM

## 2021-07-26 RX ORDER — ASPIRIN 81 MG/1
81 TABLET, CHEWABLE ORAL DAILY
Status: DISCONTINUED | OUTPATIENT
Start: 2021-07-26 | End: 2021-08-01 | Stop reason: HOSPADM

## 2021-07-26 RX ORDER — NITROGLYCERIN 0.4 MG/1
0.4 TABLET SUBLINGUAL
Status: DISCONTINUED | OUTPATIENT
Start: 2021-07-26 | End: 2021-08-01 | Stop reason: HOSPADM

## 2021-07-26 RX ORDER — MIDODRINE HYDROCHLORIDE 5 MG/1
10 TABLET ORAL 2 TIMES DAILY WITH MEALS
COMMUNITY

## 2021-07-26 RX ORDER — AMOXICILLIN 250 MG
2 CAPSULE ORAL 2 TIMES DAILY
Status: DISCONTINUED | OUTPATIENT
Start: 2021-07-26 | End: 2021-08-01 | Stop reason: HOSPADM

## 2021-07-26 RX ORDER — FAMOTIDINE 20 MG/1
40 TABLET, FILM COATED ORAL DAILY
Status: DISCONTINUED | OUTPATIENT
Start: 2021-07-26 | End: 2021-08-01 | Stop reason: HOSPADM

## 2021-07-26 RX ORDER — BISACODYL 5 MG/1
5 TABLET, DELAYED RELEASE ORAL DAILY PRN
Status: DISCONTINUED | OUTPATIENT
Start: 2021-07-26 | End: 2021-08-01 | Stop reason: HOSPADM

## 2021-07-26 RX ORDER — LORAZEPAM 0.5 MG/1
0.5 TABLET ORAL EVERY 8 HOURS PRN
Status: DISCONTINUED | OUTPATIENT
Start: 2021-07-26 | End: 2021-08-01 | Stop reason: HOSPADM

## 2021-07-26 RX ORDER — ASPIRIN 81 MG/1
324 TABLET, CHEWABLE ORAL ONCE
Status: COMPLETED | OUTPATIENT
Start: 2021-07-26 | End: 2021-07-26

## 2021-07-26 RX ORDER — CHOLECALCIFEROL (VITAMIN D3) 125 MCG
5 CAPSULE ORAL NIGHTLY PRN
Status: DISCONTINUED | OUTPATIENT
Start: 2021-07-26 | End: 2021-08-01 | Stop reason: HOSPADM

## 2021-07-26 RX ORDER — ALUMINA, MAGNESIA, AND SIMETHICONE 2400; 2400; 240 MG/30ML; MG/30ML; MG/30ML
15 SUSPENSION ORAL EVERY 6 HOURS PRN
Status: DISCONTINUED | OUTPATIENT
Start: 2021-07-26 | End: 2021-08-01 | Stop reason: HOSPADM

## 2021-07-26 RX ORDER — HEPARIN SODIUM 5000 [USP'U]/ML
5000 INJECTION, SOLUTION INTRAVENOUS; SUBCUTANEOUS EVERY 8 HOURS SCHEDULED
Status: DISCONTINUED | OUTPATIENT
Start: 2021-07-26 | End: 2021-07-26 | Stop reason: SDUPTHER

## 2021-07-26 RX ADMIN — ASPIRIN 324 MG: 81 TABLET, CHEWABLE ORAL at 16:50

## 2021-07-26 RX ADMIN — HEPARIN SODIUM 11.8 UNITS/KG/HR: 10000 INJECTION, SOLUTION INTRAVENOUS at 18:55

## 2021-07-26 RX ADMIN — ASPIRIN 81 MG: 81 TABLET, CHEWABLE ORAL at 21:55

## 2021-07-26 RX ADMIN — FAMOTIDINE 40 MG: 20 TABLET ORAL at 21:59

## 2021-07-26 RX ADMIN — HEPARIN SODIUM 11.8 UNITS/KG/HR: 10000 INJECTION, SOLUTION INTRAVENOUS at 19:02

## 2021-07-26 RX ADMIN — SODIUM CHLORIDE, PRESERVATIVE FREE 10 ML: 5 INJECTION INTRAVENOUS at 22:03

## 2021-07-26 RX ADMIN — HYDROMORPHONE HYDROCHLORIDE 0.5 MG: 1 INJECTION, SOLUTION INTRAMUSCULAR; INTRAVENOUS; SUBCUTANEOUS at 18:54

## 2021-07-26 RX ADMIN — DOCUSATE SODIUM 50MG AND SENNOSIDES 8.6MG 2 TABLET: 8.6; 5 TABLET, FILM COATED ORAL at 21:55

## 2021-07-27 ENCOUNTER — APPOINTMENT (OUTPATIENT)
Dept: CARDIOLOGY | Facility: HOSPITAL | Age: 86
End: 2021-07-27

## 2021-07-27 LAB
ANION GAP SERPL CALCULATED.3IONS-SCNC: 14.3 MMOL/L (ref 5–15)
APTT PPP: 40.4 SECONDS (ref 22.2–34.2)
APTT PPP: 46.8 SECONDS (ref 22.2–34.2)
APTT PPP: 52.9 SECONDS (ref 22.2–34.2)
ASCENDING AORTA: 3.8 CM
BASOPHILS # BLD AUTO: 0.05 10*3/MM3 (ref 0–0.2)
BASOPHILS NFR BLD AUTO: 0.5 % (ref 0–1.5)
BH CV ECHO MEAS - AO MAX PG: 42 MMHG
BH CV ECHO MEAS - AO MEAN PG: 21 MMHG
BH CV ECHO MEAS - AO ROOT DIAM: 3.7 CM
BH CV ECHO MEAS - AO V2 MAX: 323 CM/SEC
BH CV ECHO MEAS - AO V2 VTI: 79.3 CM
BH CV ECHO MEAS - AVA PLANIMETRY TRACED: 0.9 CM2
BH CV ECHO MEAS - EDV(MOD-SP2): 75.8 ML
BH CV ECHO MEAS - EDV(MOD-SP4): 90.5 ML
BH CV ECHO MEAS - EF(MOD-BP): 56 %
BH CV ECHO MEAS - ESV(MOD-SP2): 29 ML
BH CV ECHO MEAS - ESV(MOD-SP4): 44 ML
BH CV ECHO MEAS - IVSD: 1.7 CM
BH CV ECHO MEAS - LA DIMENSION(2D): 4.5 CM
BH CV ECHO MEAS - LAT PEAK E' VEL: 6.1 CM/SEC
BH CV ECHO MEAS - LV MAX PG: 3 MMHG
BH CV ECHO MEAS - LV MEAN PG: 2 MMHG
BH CV ECHO MEAS - LV V1 MAX: 91 CM/SEC
BH CV ECHO MEAS - LV V1 VTI: 23 CM
BH CV ECHO MEAS - LVIDD: 4.8 CM
BH CV ECHO MEAS - LVIDS: 3.2 CM
BH CV ECHO MEAS - LVOT DIAM: 2 CM
BH CV ECHO MEAS - LVPWD: 1.4 CM
BH CV ECHO MEAS - MED PEAK E' VEL: 4.2 CM/SEC
BH CV ECHO MEAS - MV A MAX VEL: 53 CM/SEC
BH CV ECHO MEAS - MV DEC TIME: 252 MSEC
BH CV ECHO MEAS - MV E MAX VEL: 85 CM/SEC
BH CV ECHO MEAS - MV E/A: 1.6
BH CV ECHO MEAS - MV MAX PG: 3 MMHG
BH CV ECHO MEAS - MV MEAN PG: 1 MMHG
BH CV ECHO MEAS - MV P1/2T: 70 MSEC
BH CV ECHO MEAS - MV V2 VTI: 30 CM
BH CV ECHO MEAS - MVA(P1/2T): 3.1 CM2
BH CV ECHO MEAS - RAP SYSTOLE: 3 MMHG
BH CV ECHO MEAS - RVDD: 2.8 CM
BH CV ECHO MEAS - RVSP: 35 MMHG
BH CV ECHO MEAS - TR MAX PG: 32 MMHG
BH CV ECHO MEAS - TR MAX VEL: 284 CM/SEC
BH CV ECHO MEASUREMENTS AVERAGE E/E' RATIO: 16.5
BUN SERPL-MCNC: 39 MG/DL (ref 8–23)
BUN/CREAT SERPL: 5.2 (ref 7–25)
CALCIUM SPEC-SCNC: 8.9 MG/DL (ref 8.6–10.5)
CHLORIDE SERPL-SCNC: 93 MMOL/L (ref 98–107)
CO2 SERPL-SCNC: 21.7 MMOL/L (ref 22–29)
CREAT SERPL-MCNC: 7.56 MG/DL (ref 0.76–1.27)
DEPRECATED RDW RBC AUTO: 55.8 FL (ref 37–54)
EOSINOPHIL # BLD AUTO: 0.19 10*3/MM3 (ref 0–0.4)
EOSINOPHIL NFR BLD AUTO: 1.9 % (ref 0.3–6.2)
ERYTHROCYTE [DISTWIDTH] IN BLOOD BY AUTOMATED COUNT: 14.9 % (ref 12.3–15.4)
GFR SERPL CREATININE-BSD FRML MDRD: 7 ML/MIN/1.73
GFR SERPL CREATININE-BSD FRML MDRD: ABNORMAL ML/MIN/{1.73_M2}
GLUCOSE BLDC GLUCOMTR-MCNC: 134 MG/DL (ref 70–99)
GLUCOSE SERPL-MCNC: 153 MG/DL (ref 65–99)
HCT VFR BLD AUTO: 31.6 % (ref 37.5–51)
HGB BLD-MCNC: 10.6 G/DL (ref 13–17.7)
IMM GRANULOCYTES # BLD AUTO: 0.18 10*3/MM3 (ref 0–0.05)
IMM GRANULOCYTES NFR BLD AUTO: 1.8 % (ref 0–0.5)
IVRT: 95 MSEC
LEFT ATRIUM VOLUME INDEX: 33.7 ML/M2
LYMPHOCYTES # BLD AUTO: 1.93 10*3/MM3 (ref 0.7–3.1)
LYMPHOCYTES NFR BLD AUTO: 19.3 % (ref 19.6–45.3)
MAXIMAL PREDICTED HEART RATE: 137 BPM
MCH RBC QN AUTO: 34.4 PG (ref 26.6–33)
MCHC RBC AUTO-ENTMCNC: 33.5 G/DL (ref 31.5–35.7)
MCV RBC AUTO: 102.6 FL (ref 79–97)
MONOCYTES # BLD AUTO: 0.64 10*3/MM3 (ref 0.1–0.9)
MONOCYTES NFR BLD AUTO: 6.4 % (ref 5–12)
NEUTROPHILS NFR BLD AUTO: 7 10*3/MM3 (ref 1.7–7)
NEUTROPHILS NFR BLD AUTO: 70.1 % (ref 42.7–76)
NRBC BLD AUTO-RTO: 0 /100 WBC (ref 0–0.2)
PLATELET # BLD AUTO: 247 10*3/MM3 (ref 140–450)
PMV BLD AUTO: 9 FL (ref 6–12)
POTASSIUM SERPL-SCNC: 4.2 MMOL/L (ref 3.5–5.2)
RBC # BLD AUTO: 3.08 10*6/MM3 (ref 4.14–5.8)
SODIUM SERPL-SCNC: 129 MMOL/L (ref 136–145)
STRESS TARGET HR: 116 BPM
TROPONIN T SERPL-MCNC: 0.21 NG/ML (ref 0–0.03)
TROPONIN T SERPL-MCNC: 0.24 NG/ML (ref 0–0.03)
WBC # BLD AUTO: 9.99 10*3/MM3 (ref 3.4–10.8)

## 2021-07-27 PROCEDURE — 80048 BASIC METABOLIC PNL TOTAL CA: CPT | Performed by: INTERNAL MEDICINE

## 2021-07-27 PROCEDURE — 97530 THERAPEUTIC ACTIVITIES: CPT

## 2021-07-27 PROCEDURE — 97161 PT EVAL LOW COMPLEX 20 MIN: CPT

## 2021-07-27 PROCEDURE — 97116 GAIT TRAINING THERAPY: CPT

## 2021-07-27 PROCEDURE — 85025 COMPLETE CBC W/AUTO DIFF WBC: CPT | Performed by: INTERNAL MEDICINE

## 2021-07-27 PROCEDURE — 94799 UNLISTED PULMONARY SVC/PX: CPT

## 2021-07-27 PROCEDURE — 85730 THROMBOPLASTIN TIME PARTIAL: CPT | Performed by: INTERNAL MEDICINE

## 2021-07-27 PROCEDURE — 82962 GLUCOSE BLOOD TEST: CPT

## 2021-07-27 PROCEDURE — 25010000002 HEPARIN (PORCINE) PER 1000 UNITS: Performed by: EMERGENCY MEDICINE

## 2021-07-27 PROCEDURE — 94640 AIRWAY INHALATION TREATMENT: CPT

## 2021-07-27 PROCEDURE — 25010000002 HYDROMORPHONE 1 MG/ML SOLUTION: Performed by: INTERNAL MEDICINE

## 2021-07-27 PROCEDURE — 84484 ASSAY OF TROPONIN QUANT: CPT | Performed by: INTERNAL MEDICINE

## 2021-07-27 PROCEDURE — 63710000001 PREDNISONE PER 5 MG: Performed by: INTERNAL MEDICINE

## 2021-07-27 PROCEDURE — 93306 TTE W/DOPPLER COMPLETE: CPT | Performed by: INTERNAL MEDICINE

## 2021-07-27 PROCEDURE — 93306 TTE W/DOPPLER COMPLETE: CPT

## 2021-07-27 PROCEDURE — 99232 SBSQ HOSP IP/OBS MODERATE 35: CPT | Performed by: SPECIALIST

## 2021-07-27 PROCEDURE — 97165 OT EVAL LOW COMPLEX 30 MIN: CPT

## 2021-07-27 RX ORDER — GABAPENTIN 100 MG/1
100 CAPSULE ORAL 2 TIMES DAILY
Status: DISCONTINUED | OUTPATIENT
Start: 2021-07-27 | End: 2021-08-01 | Stop reason: HOSPADM

## 2021-07-27 RX ORDER — AMIODARONE HYDROCHLORIDE 200 MG/1
200 TABLET ORAL DAILY
Status: DISCONTINUED | OUTPATIENT
Start: 2021-07-27 | End: 2021-08-01 | Stop reason: HOSPADM

## 2021-07-27 RX ORDER — DILTIAZEM HYDROCHLORIDE 120 MG/1
120 CAPSULE, COATED, EXTENDED RELEASE ORAL DAILY
Status: DISCONTINUED | OUTPATIENT
Start: 2021-07-27 | End: 2021-07-30

## 2021-07-27 RX ORDER — SODIUM CHLORIDE, SODIUM LACTATE, CALCIUM CHLORIDE, MAGNESIUM CHLORIDE AND DEXTROSE 1.5; 538; 448; 18.3; 5.08 G/100ML; MG/100ML; MG/100ML; MG/100ML; MG/100ML
2000 INJECTION, SOLUTION INTRAPERITONEAL 4 TIMES DAILY
Status: DISCONTINUED | OUTPATIENT
Start: 2021-07-27 | End: 2021-08-01 | Stop reason: HOSPADM

## 2021-07-27 RX ORDER — CALCITRIOL 0.25 UG/1
0.5 CAPSULE, LIQUID FILLED ORAL DAILY
Status: DISCONTINUED | OUTPATIENT
Start: 2021-07-27 | End: 2021-08-01 | Stop reason: HOSPADM

## 2021-07-27 RX ORDER — DOCUSATE SODIUM 100 MG/1
100 CAPSULE, LIQUID FILLED ORAL DAILY PRN
Status: DISCONTINUED | OUTPATIENT
Start: 2021-07-27 | End: 2021-08-01 | Stop reason: HOSPADM

## 2021-07-27 RX ORDER — CALCIUM ACETATE 667 MG/1
667 CAPSULE ORAL
Status: DISCONTINUED | OUTPATIENT
Start: 2021-07-27 | End: 2021-08-01 | Stop reason: HOSPADM

## 2021-07-27 RX ORDER — MIDODRINE HYDROCHLORIDE 10 MG/1
10 TABLET ORAL
Status: DISCONTINUED | OUTPATIENT
Start: 2021-07-27 | End: 2021-08-01 | Stop reason: HOSPADM

## 2021-07-27 RX ORDER — BUDESONIDE AND FORMOTEROL FUMARATE DIHYDRATE 160; 4.5 UG/1; UG/1
2 AEROSOL RESPIRATORY (INHALATION)
Status: DISCONTINUED | OUTPATIENT
Start: 2021-07-27 | End: 2021-07-27

## 2021-07-27 RX ORDER — ALBUTEROL SULFATE 2.5 MG/3ML
2.5 SOLUTION RESPIRATORY (INHALATION)
Status: DISCONTINUED | OUTPATIENT
Start: 2021-07-27 | End: 2021-08-01 | Stop reason: HOSPADM

## 2021-07-27 RX ORDER — PREDNISONE 10 MG/1
10 TABLET ORAL
Status: DISCONTINUED | OUTPATIENT
Start: 2021-07-27 | End: 2021-08-01 | Stop reason: HOSPADM

## 2021-07-27 RX ORDER — FINASTERIDE 5 MG/1
5 TABLET, FILM COATED ORAL DAILY
Status: DISCONTINUED | OUTPATIENT
Start: 2021-07-27 | End: 2021-08-01 | Stop reason: HOSPADM

## 2021-07-27 RX ORDER — ARFORMOTEROL TARTRATE 15 UG/2ML
15 SOLUTION RESPIRATORY (INHALATION)
Status: DISCONTINUED | OUTPATIENT
Start: 2021-07-27 | End: 2021-08-01 | Stop reason: HOSPADM

## 2021-07-27 RX ORDER — LEVOTHYROXINE SODIUM 0.15 MG/1
150 TABLET ORAL
Status: DISCONTINUED | OUTPATIENT
Start: 2021-07-27 | End: 2021-07-28

## 2021-07-27 RX ORDER — MONTELUKAST SODIUM 10 MG/1
10 TABLET ORAL DAILY
Status: DISCONTINUED | OUTPATIENT
Start: 2021-07-27 | End: 2021-08-01 | Stop reason: HOSPADM

## 2021-07-27 RX ORDER — CETIRIZINE HYDROCHLORIDE 10 MG/1
5 TABLET ORAL DAILY
Status: DISCONTINUED | OUTPATIENT
Start: 2021-07-27 | End: 2021-08-01 | Stop reason: HOSPADM

## 2021-07-27 RX ORDER — BUDESONIDE 0.5 MG/2ML
0.5 INHALANT ORAL
Status: DISCONTINUED | OUTPATIENT
Start: 2021-07-27 | End: 2021-08-01 | Stop reason: HOSPADM

## 2021-07-27 RX ADMIN — CETIRIZINE HYDROCHLORIDE 5 MG: 10 TABLET, FILM COATED ORAL at 08:31

## 2021-07-27 RX ADMIN — GABAPENTIN 100 MG: 100 CAPSULE ORAL at 08:32

## 2021-07-27 RX ADMIN — HYDROCODONE BITARTRATE AND ACETAMINOPHEN 2 TABLET: 7.5; 325 TABLET ORAL at 18:23

## 2021-07-27 RX ADMIN — BUDESONIDE 0.5 MG: 0.5 INHALANT ORAL at 18:51

## 2021-07-27 RX ADMIN — ALBUTEROL SULFATE 2.5 MG: 2.5 SOLUTION RESPIRATORY (INHALATION) at 08:35

## 2021-07-27 RX ADMIN — ARFORMOTEROL TARTRATE 15 MCG: 15 SOLUTION RESPIRATORY (INHALATION) at 18:51

## 2021-07-27 RX ADMIN — ALBUTEROL SULFATE 2.5 MG: 2.5 SOLUTION RESPIRATORY (INHALATION) at 11:56

## 2021-07-27 RX ADMIN — MIDODRINE HYDROCHLORIDE 10 MG: 10 TABLET ORAL at 08:32

## 2021-07-27 RX ADMIN — HYDROCODONE BITARTRATE AND ACETAMINOPHEN 2 TABLET: 7.5; 325 TABLET ORAL at 10:14

## 2021-07-27 RX ADMIN — CALCIUM ACETATE 667 MG: 667 CAPSULE ORAL at 17:55

## 2021-07-27 RX ADMIN — AMIODARONE HYDROCHLORIDE 200 MG: 200 TABLET ORAL at 08:33

## 2021-07-27 RX ADMIN — HEPARIN SODIUM 11.8 UNITS/KG/HR: 10000 INJECTION, SOLUTION INTRAVENOUS at 11:33

## 2021-07-27 RX ADMIN — CALCITRIOL CAPSULES 0.25 MCG 0.5 MCG: 0.25 CAPSULE ORAL at 08:34

## 2021-07-27 RX ADMIN — BUDESONIDE 0.5 MG: 0.5 INHALANT ORAL at 08:35

## 2021-07-27 RX ADMIN — FINASTERIDE 5 MG: 5 TABLET, FILM COATED ORAL at 08:34

## 2021-07-27 RX ADMIN — PREDNISONE 10 MG: 10 TABLET ORAL at 08:33

## 2021-07-27 RX ADMIN — SODIUM CHLORIDE, PRESERVATIVE FREE 10 ML: 5 INJECTION INTRAVENOUS at 21:03

## 2021-07-27 RX ADMIN — METOPROLOL TARTRATE 12.5 MG: 25 TABLET, FILM COATED ORAL at 21:03

## 2021-07-27 RX ADMIN — METOPROLOL TARTRATE 12.5 MG: 25 TABLET, FILM COATED ORAL at 08:32

## 2021-07-27 RX ADMIN — ASPIRIN 81 MG: 81 TABLET, CHEWABLE ORAL at 08:32

## 2021-07-27 RX ADMIN — HYDROMORPHONE HYDROCHLORIDE 0.5 MG: 1 INJECTION, SOLUTION INTRAMUSCULAR; INTRAVENOUS; SUBCUTANEOUS at 02:31

## 2021-07-27 RX ADMIN — LEVOTHYROXINE SODIUM 150 MCG: 0.15 TABLET ORAL at 08:33

## 2021-07-27 RX ADMIN — FAMOTIDINE 40 MG: 20 TABLET ORAL at 08:32

## 2021-07-27 RX ADMIN — SODIUM CHLORIDE, SODIUM LACTATE, CALCIUM CHLORIDE, MAGNESIUM CHLORIDE AND DEXTROSE 2000 ML: 1.5; 538; 448; 18.3; 5.08 INJECTION, SOLUTION INTRAPERITONEAL at 08:02

## 2021-07-27 RX ADMIN — ALBUTEROL SULFATE 2.5 MG: 2.5 SOLUTION RESPIRATORY (INHALATION) at 15:49

## 2021-07-27 RX ADMIN — GABAPENTIN 100 MG: 100 CAPSULE ORAL at 21:03

## 2021-07-27 RX ADMIN — Medication 5000 UNITS: at 08:32

## 2021-07-27 RX ADMIN — CALCIUM ACETATE 667 MG: 667 CAPSULE ORAL at 08:32

## 2021-07-27 RX ADMIN — SODIUM CHLORIDE, SODIUM LACTATE, CALCIUM CHLORIDE, MAGNESIUM CHLORIDE AND DEXTROSE 2000 ML: 1.5; 538; 448; 18.3; 5.08 INJECTION, SOLUTION INTRAPERITONEAL at 12:33

## 2021-07-27 RX ADMIN — SODIUM CHLORIDE, SODIUM LACTATE, CALCIUM CHLORIDE, MAGNESIUM CHLORIDE AND DEXTROSE 2000 ML: 1.5; 538; 448; 18.3; 5.08 INJECTION, SOLUTION INTRAPERITONEAL at 17:56

## 2021-07-27 RX ADMIN — ARFORMOTEROL TARTRATE 15 MCG: 15 SOLUTION RESPIRATORY (INHALATION) at 08:35

## 2021-07-27 RX ADMIN — MONTELUKAST 10 MG: 10 TABLET, FILM COATED ORAL at 08:33

## 2021-07-27 RX ADMIN — CALCIUM ACETATE 667 MG: 667 CAPSULE ORAL at 12:53

## 2021-07-27 RX ADMIN — MIDODRINE HYDROCHLORIDE 10 MG: 10 TABLET ORAL at 11:21

## 2021-07-27 RX ADMIN — SODIUM CHLORIDE, SODIUM LACTATE, CALCIUM CHLORIDE, MAGNESIUM CHLORIDE AND DEXTROSE 2000 ML: 1.5; 538; 448; 18.3; 5.08 INJECTION, SOLUTION INTRAPERITONEAL at 01:20

## 2021-07-27 RX ADMIN — DILTIAZEM HYDROCHLORIDE 120 MG: 120 CAPSULE, COATED, EXTENDED RELEASE ORAL at 08:32

## 2021-07-27 RX ADMIN — MIDODRINE HYDROCHLORIDE 10 MG: 10 TABLET ORAL at 17:55

## 2021-07-27 RX ADMIN — DOCUSATE SODIUM 50MG AND SENNOSIDES 8.6MG 2 TABLET: 8.6; 5 TABLET, FILM COATED ORAL at 08:32

## 2021-07-27 RX ADMIN — ALBUTEROL SULFATE 2.5 MG: 2.5 SOLUTION RESPIRATORY (INHALATION) at 18:51

## 2021-07-28 PROBLEM — E03.8 OTHER SPECIFIED HYPOTHYROIDISM: Status: ACTIVE | Noted: 2021-07-28

## 2021-07-28 LAB
ANION GAP SERPL CALCULATED.3IONS-SCNC: 17.2 MMOL/L (ref 5–15)
BASOPHILS # BLD AUTO: 0.04 10*3/MM3 (ref 0–0.2)
BASOPHILS NFR BLD AUTO: 0.4 % (ref 0–1.5)
BUN SERPL-MCNC: 40 MG/DL (ref 8–23)
BUN/CREAT SERPL: 5.5 (ref 7–25)
CALCIUM SPEC-SCNC: 8.6 MG/DL (ref 8.6–10.5)
CHLORIDE SERPL-SCNC: 93 MMOL/L (ref 98–107)
CO2 SERPL-SCNC: 20.8 MMOL/L (ref 22–29)
CREAT SERPL-MCNC: 7.24 MG/DL (ref 0.76–1.27)
DEPRECATED RDW RBC AUTO: 53.7 FL (ref 37–54)
EOSINOPHIL # BLD AUTO: 0.18 10*3/MM3 (ref 0–0.4)
EOSINOPHIL NFR BLD AUTO: 1.8 % (ref 0.3–6.2)
ERYTHROCYTE [DISTWIDTH] IN BLOOD BY AUTOMATED COUNT: 14.5 % (ref 12.3–15.4)
GFR SERPL CREATININE-BSD FRML MDRD: 7 ML/MIN/1.73
GFR SERPL CREATININE-BSD FRML MDRD: ABNORMAL ML/MIN/{1.73_M2}
GLUCOSE SERPL-MCNC: 159 MG/DL (ref 65–99)
HCT VFR BLD AUTO: 30 % (ref 37.5–51)
HGB BLD-MCNC: 10 G/DL (ref 13–17.7)
IMM GRANULOCYTES # BLD AUTO: 0.19 10*3/MM3 (ref 0–0.05)
IMM GRANULOCYTES NFR BLD AUTO: 1.9 % (ref 0–0.5)
LYMPHOCYTES # BLD AUTO: 1.41 10*3/MM3 (ref 0.7–3.1)
LYMPHOCYTES NFR BLD AUTO: 13.7 % (ref 19.6–45.3)
MCH RBC QN AUTO: 34.2 PG (ref 26.6–33)
MCHC RBC AUTO-ENTMCNC: 33.3 G/DL (ref 31.5–35.7)
MCV RBC AUTO: 102.7 FL (ref 79–97)
MONOCYTES # BLD AUTO: 0.61 10*3/MM3 (ref 0.1–0.9)
MONOCYTES NFR BLD AUTO: 5.9 % (ref 5–12)
NEUTROPHILS NFR BLD AUTO: 7.83 10*3/MM3 (ref 1.7–7)
NEUTROPHILS NFR BLD AUTO: 76.3 % (ref 42.7–76)
NRBC BLD AUTO-RTO: 0 /100 WBC (ref 0–0.2)
PLATELET # BLD AUTO: 241 10*3/MM3 (ref 140–450)
PMV BLD AUTO: 9 FL (ref 6–12)
POTASSIUM SERPL-SCNC: 3.9 MMOL/L (ref 3.5–5.2)
RBC # BLD AUTO: 2.92 10*6/MM3 (ref 4.14–5.8)
SODIUM SERPL-SCNC: 131 MMOL/L (ref 136–145)
WBC # BLD AUTO: 10.26 10*3/MM3 (ref 3.4–10.8)

## 2021-07-28 PROCEDURE — 63710000001 PREDNISONE PER 5 MG: Performed by: INTERNAL MEDICINE

## 2021-07-28 PROCEDURE — 94799 UNLISTED PULMONARY SVC/PX: CPT

## 2021-07-28 PROCEDURE — 36415 COLL VENOUS BLD VENIPUNCTURE: CPT | Performed by: INTERNAL MEDICINE

## 2021-07-28 PROCEDURE — 80048 BASIC METABOLIC PNL TOTAL CA: CPT | Performed by: INTERNAL MEDICINE

## 2021-07-28 PROCEDURE — 99232 SBSQ HOSP IP/OBS MODERATE 35: CPT | Performed by: SPECIALIST

## 2021-07-28 PROCEDURE — 85025 COMPLETE CBC W/AUTO DIFF WBC: CPT | Performed by: INTERNAL MEDICINE

## 2021-07-28 PROCEDURE — 97530 THERAPEUTIC ACTIVITIES: CPT

## 2021-07-28 RX ORDER — LEVOTHYROXINE SODIUM 0.03 MG/1
25 TABLET ORAL ONCE
Status: COMPLETED | OUTPATIENT
Start: 2021-07-28 | End: 2021-07-28

## 2021-07-28 RX ORDER — LEVOTHYROXINE SODIUM 175 UG/1
175 TABLET ORAL
Status: DISCONTINUED | OUTPATIENT
Start: 2021-07-29 | End: 2021-08-01 | Stop reason: HOSPADM

## 2021-07-28 RX ADMIN — CALCITRIOL CAPSULES 0.25 MCG 0.5 MCG: 0.25 CAPSULE ORAL at 08:39

## 2021-07-28 RX ADMIN — SODIUM CHLORIDE, SODIUM LACTATE, CALCIUM CHLORIDE, MAGNESIUM CHLORIDE AND DEXTROSE 2000 ML: 1.5; 538; 448; 18.3; 5.08 INJECTION, SOLUTION INTRAPERITONEAL at 00:41

## 2021-07-28 RX ADMIN — MONTELUKAST 10 MG: 10 TABLET, FILM COATED ORAL at 09:50

## 2021-07-28 RX ADMIN — ALBUTEROL SULFATE 2.5 MG: 2.5 SOLUTION RESPIRATORY (INHALATION) at 11:52

## 2021-07-28 RX ADMIN — BUDESONIDE 0.5 MG: 0.5 INHALANT ORAL at 07:48

## 2021-07-28 RX ADMIN — ASPIRIN 81 MG: 81 TABLET, CHEWABLE ORAL at 08:40

## 2021-07-28 RX ADMIN — ALBUTEROL SULFATE 2.5 MG: 2.5 SOLUTION RESPIRATORY (INHALATION) at 16:00

## 2021-07-28 RX ADMIN — LEVOTHYROXINE SODIUM 25 MCG: 25 TABLET ORAL at 13:28

## 2021-07-28 RX ADMIN — SODIUM CHLORIDE, PRESERVATIVE FREE 10 ML: 5 INJECTION INTRAVENOUS at 09:50

## 2021-07-28 RX ADMIN — MIDODRINE HYDROCHLORIDE 10 MG: 10 TABLET ORAL at 12:18

## 2021-07-28 RX ADMIN — ALBUTEROL SULFATE 2.5 MG: 2.5 SOLUTION RESPIRATORY (INHALATION) at 00:09

## 2021-07-28 RX ADMIN — MIDODRINE HYDROCHLORIDE 10 MG: 10 TABLET ORAL at 17:56

## 2021-07-28 RX ADMIN — CALCIUM ACETATE 667 MG: 667 CAPSULE ORAL at 17:56

## 2021-07-28 RX ADMIN — FAMOTIDINE 40 MG: 20 TABLET ORAL at 08:39

## 2021-07-28 RX ADMIN — Medication 5000 UNITS: at 08:40

## 2021-07-28 RX ADMIN — GABAPENTIN 100 MG: 100 CAPSULE ORAL at 08:40

## 2021-07-28 RX ADMIN — ALBUTEROL SULFATE 2.5 MG: 2.5 SOLUTION RESPIRATORY (INHALATION) at 23:09

## 2021-07-28 RX ADMIN — METOPROLOL TARTRATE 12.5 MG: 25 TABLET, FILM COATED ORAL at 20:20

## 2021-07-28 RX ADMIN — ARFORMOTEROL TARTRATE 15 MCG: 15 SOLUTION RESPIRATORY (INHALATION) at 18:13

## 2021-07-28 RX ADMIN — PREDNISONE 10 MG: 10 TABLET ORAL at 08:40

## 2021-07-28 RX ADMIN — GABAPENTIN 100 MG: 100 CAPSULE ORAL at 20:21

## 2021-07-28 RX ADMIN — SODIUM CHLORIDE, SODIUM LACTATE, CALCIUM CHLORIDE, MAGNESIUM CHLORIDE AND DEXTROSE 2000 ML: 1.5; 538; 448; 18.3; 5.08 INJECTION, SOLUTION INTRAPERITONEAL at 08:13

## 2021-07-28 RX ADMIN — FINASTERIDE 5 MG: 5 TABLET, FILM COATED ORAL at 09:50

## 2021-07-28 RX ADMIN — CALCIUM ACETATE 667 MG: 667 CAPSULE ORAL at 07:33

## 2021-07-28 RX ADMIN — METOPROLOL TARTRATE 12.5 MG: 25 TABLET, FILM COATED ORAL at 08:40

## 2021-07-28 RX ADMIN — SODIUM CHLORIDE, SODIUM LACTATE, CALCIUM CHLORIDE, MAGNESIUM CHLORIDE AND DEXTROSE 2000 ML: 1.5; 538; 448; 18.3; 5.08 INJECTION, SOLUTION INTRAPERITONEAL at 17:56

## 2021-07-28 RX ADMIN — LEVOTHYROXINE SODIUM 150 MCG: 0.15 TABLET ORAL at 06:44

## 2021-07-28 RX ADMIN — MIDODRINE HYDROCHLORIDE 10 MG: 10 TABLET ORAL at 07:33

## 2021-07-28 RX ADMIN — ALBUTEROL SULFATE 2.5 MG: 2.5 SOLUTION RESPIRATORY (INHALATION) at 21:04

## 2021-07-28 RX ADMIN — SODIUM CHLORIDE, SODIUM LACTATE, CALCIUM CHLORIDE, MAGNESIUM CHLORIDE AND DEXTROSE 2000 ML: 1.5; 538; 448; 18.3; 5.08 INJECTION, SOLUTION INTRAPERITONEAL at 12:18

## 2021-07-28 RX ADMIN — ARFORMOTEROL TARTRATE 15 MCG: 15 SOLUTION RESPIRATORY (INHALATION) at 07:48

## 2021-07-28 RX ADMIN — ALBUTEROL SULFATE 2.5 MG: 2.5 SOLUTION RESPIRATORY (INHALATION) at 18:13

## 2021-07-28 RX ADMIN — BUDESONIDE 0.5 MG: 0.5 INHALANT ORAL at 18:13

## 2021-07-28 RX ADMIN — CETIRIZINE HYDROCHLORIDE 5 MG: 10 TABLET, FILM COATED ORAL at 08:40

## 2021-07-28 RX ADMIN — DILTIAZEM HYDROCHLORIDE 120 MG: 120 CAPSULE, COATED, EXTENDED RELEASE ORAL at 08:40

## 2021-07-28 RX ADMIN — ALBUTEROL SULFATE 2.5 MG: 2.5 SOLUTION RESPIRATORY (INHALATION) at 07:48

## 2021-07-28 RX ADMIN — SODIUM CHLORIDE, PRESERVATIVE FREE 10 ML: 5 INJECTION INTRAVENOUS at 20:21

## 2021-07-28 RX ADMIN — CALCIUM ACETATE 667 MG: 667 CAPSULE ORAL at 12:18

## 2021-07-28 RX ADMIN — AMIODARONE HYDROCHLORIDE 200 MG: 200 TABLET ORAL at 08:40

## 2021-07-29 LAB — GLUCOSE BLDC GLUCOMTR-MCNC: 189 MG/DL (ref 70–99)

## 2021-07-29 PROCEDURE — 94799 UNLISTED PULMONARY SVC/PX: CPT

## 2021-07-29 PROCEDURE — 97530 THERAPEUTIC ACTIVITIES: CPT

## 2021-07-29 PROCEDURE — 99232 SBSQ HOSP IP/OBS MODERATE 35: CPT | Performed by: SPECIALIST

## 2021-07-29 PROCEDURE — 82962 GLUCOSE BLOOD TEST: CPT

## 2021-07-29 RX ORDER — GINSENG 100 MG
CAPSULE ORAL ONCE
Status: DISCONTINUED | OUTPATIENT
Start: 2021-07-29 | End: 2021-07-29

## 2021-07-29 RX ADMIN — CALCIUM ACETATE 667 MG: 667 CAPSULE ORAL at 18:12

## 2021-07-29 RX ADMIN — FINASTERIDE 5 MG: 5 TABLET, FILM COATED ORAL at 09:15

## 2021-07-29 RX ADMIN — SODIUM CHLORIDE, SODIUM LACTATE, CALCIUM CHLORIDE, MAGNESIUM CHLORIDE AND DEXTROSE 2000 ML: 1.5; 538; 448; 18.3; 5.08 INJECTION, SOLUTION INTRAPERITONEAL at 06:06

## 2021-07-29 RX ADMIN — MIDODRINE HYDROCHLORIDE 10 MG: 10 TABLET ORAL at 09:16

## 2021-07-29 RX ADMIN — ALBUTEROL SULFATE 2.5 MG: 2.5 SOLUTION RESPIRATORY (INHALATION) at 11:38

## 2021-07-29 RX ADMIN — GABAPENTIN 100 MG: 100 CAPSULE ORAL at 09:16

## 2021-07-29 RX ADMIN — METOPROLOL TARTRATE 5 MG: 1 INJECTION, SOLUTION INTRAVENOUS at 23:07

## 2021-07-29 RX ADMIN — DOCUSATE SODIUM 50MG AND SENNOSIDES 8.6MG 2 TABLET: 8.6; 5 TABLET, FILM COATED ORAL at 09:16

## 2021-07-29 RX ADMIN — AMIODARONE HYDROCHLORIDE 200 MG: 200 TABLET ORAL at 09:16

## 2021-07-29 RX ADMIN — ALBUTEROL SULFATE 2.5 MG: 2.5 SOLUTION RESPIRATORY (INHALATION) at 06:32

## 2021-07-29 RX ADMIN — DILTIAZEM HYDROCHLORIDE 120 MG: 120 CAPSULE, COATED, EXTENDED RELEASE ORAL at 09:16

## 2021-07-29 RX ADMIN — METOPROLOL TARTRATE 12.5 MG: 25 TABLET, FILM COATED ORAL at 09:17

## 2021-07-29 RX ADMIN — MIDODRINE HYDROCHLORIDE 10 MG: 10 TABLET ORAL at 18:38

## 2021-07-29 RX ADMIN — SODIUM CHLORIDE, PRESERVATIVE FREE 10 ML: 5 INJECTION INTRAVENOUS at 20:39

## 2021-07-29 RX ADMIN — ALBUTEROL SULFATE 2.5 MG: 2.5 SOLUTION RESPIRATORY (INHALATION) at 18:34

## 2021-07-29 RX ADMIN — SODIUM CHLORIDE, SODIUM LACTATE, CALCIUM CHLORIDE, MAGNESIUM CHLORIDE AND DEXTROSE 2000 ML: 1.5; 538; 448; 18.3; 5.08 INJECTION, SOLUTION INTRAPERITONEAL at 00:14

## 2021-07-29 RX ADMIN — GABAPENTIN 100 MG: 100 CAPSULE ORAL at 20:38

## 2021-07-29 RX ADMIN — ARFORMOTEROL TARTRATE 15 MCG: 15 SOLUTION RESPIRATORY (INHALATION) at 06:31

## 2021-07-29 RX ADMIN — HYDROCODONE BITARTRATE AND ACETAMINOPHEN 2 TABLET: 7.5; 325 TABLET ORAL at 12:40

## 2021-07-29 RX ADMIN — BUDESONIDE 0.5 MG: 0.5 INHALANT ORAL at 18:24

## 2021-07-29 RX ADMIN — ALBUTEROL SULFATE 2.5 MG: 2.5 SOLUTION RESPIRATORY (INHALATION) at 15:19

## 2021-07-29 RX ADMIN — DOCUSATE SODIUM 50MG AND SENNOSIDES 8.6MG 2 TABLET: 8.6; 5 TABLET, FILM COATED ORAL at 20:38

## 2021-07-29 RX ADMIN — LEVOTHYROXINE SODIUM 175 MCG: 175 TABLET ORAL at 06:02

## 2021-07-29 RX ADMIN — MIDODRINE HYDROCHLORIDE 10 MG: 10 TABLET ORAL at 12:32

## 2021-07-29 RX ADMIN — SODIUM CHLORIDE, PRESERVATIVE FREE 10 ML: 5 INJECTION INTRAVENOUS at 09:15

## 2021-07-29 RX ADMIN — SODIUM CHLORIDE, SODIUM LACTATE, CALCIUM CHLORIDE, MAGNESIUM CHLORIDE AND DEXTROSE 2000 ML: 1.5; 538; 448; 18.3; 5.08 INJECTION, SOLUTION INTRAPERITONEAL at 18:41

## 2021-07-29 RX ADMIN — CALCITRIOL CAPSULES 0.25 MCG 0.5 MCG: 0.25 CAPSULE ORAL at 09:15

## 2021-07-29 RX ADMIN — CALCIUM ACETATE 667 MG: 667 CAPSULE ORAL at 09:16

## 2021-07-29 RX ADMIN — FAMOTIDINE 40 MG: 20 TABLET ORAL at 09:16

## 2021-07-29 RX ADMIN — SODIUM CHLORIDE, SODIUM LACTATE, CALCIUM CHLORIDE, MAGNESIUM CHLORIDE AND DEXTROSE 2000 ML: 1.5; 538; 448; 18.3; 5.08 INJECTION, SOLUTION INTRAPERITONEAL at 12:33

## 2021-07-29 RX ADMIN — CALCIUM ACETATE 667 MG: 667 CAPSULE ORAL at 12:40

## 2021-07-29 RX ADMIN — BUDESONIDE 0.5 MG: 0.5 INHALANT ORAL at 06:31

## 2021-07-29 RX ADMIN — ALBUTEROL SULFATE 2.5 MG: 2.5 SOLUTION RESPIRATORY (INHALATION) at 04:02

## 2021-07-29 RX ADMIN — ASPIRIN 81 MG: 81 TABLET, CHEWABLE ORAL at 09:15

## 2021-07-29 RX ADMIN — CETIRIZINE HYDROCHLORIDE 5 MG: 10 TABLET, FILM COATED ORAL at 09:16

## 2021-07-29 RX ADMIN — ALBUTEROL SULFATE 2.5 MG: 2.5 SOLUTION RESPIRATORY (INHALATION) at 23:25

## 2021-07-29 RX ADMIN — Medication 5000 UNITS: at 09:16

## 2021-07-29 RX ADMIN — ARFORMOTEROL TARTRATE 15 MCG: 15 SOLUTION RESPIRATORY (INHALATION) at 18:24

## 2021-07-29 RX ADMIN — METOPROLOL TARTRATE 12.5 MG: 25 TABLET, FILM COATED ORAL at 20:38

## 2021-07-30 ENCOUNTER — APPOINTMENT (OUTPATIENT)
Dept: CT IMAGING | Facility: HOSPITAL | Age: 86
End: 2021-07-30

## 2021-07-30 LAB
CK MB SERPL-CCNC: 2.25 NG/ML
CK SERPL-CCNC: 36 U/L (ref 20–200)
GLUCOSE BLDC GLUCOMTR-MCNC: 275 MG/DL (ref 70–99)
GLUCOSE BLDC GLUCOMTR-MCNC: 300 MG/DL (ref 70–99)
TROPONIN T SERPL-MCNC: 0.22 NG/ML (ref 0–0.03)

## 2021-07-30 PROCEDURE — 99232 SBSQ HOSP IP/OBS MODERATE 35: CPT | Performed by: SPECIALIST

## 2021-07-30 PROCEDURE — 82550 ASSAY OF CK (CPK): CPT | Performed by: INTERNAL MEDICINE

## 2021-07-30 PROCEDURE — 63710000001 PREDNISONE PER 5 MG: Performed by: INTERNAL MEDICINE

## 2021-07-30 PROCEDURE — 25010000002 ONDANSETRON PER 1 MG: Performed by: INTERNAL MEDICINE

## 2021-07-30 PROCEDURE — 94799 UNLISTED PULMONARY SVC/PX: CPT

## 2021-07-30 PROCEDURE — 84484 ASSAY OF TROPONIN QUANT: CPT | Performed by: INTERNAL MEDICINE

## 2021-07-30 PROCEDURE — 82962 GLUCOSE BLOOD TEST: CPT

## 2021-07-30 PROCEDURE — 93010 ELECTROCARDIOGRAM REPORT: CPT | Performed by: INTERNAL MEDICINE

## 2021-07-30 PROCEDURE — 94760 N-INVAS EAR/PLS OXIMETRY 1: CPT

## 2021-07-30 PROCEDURE — 70450 CT HEAD/BRAIN W/O DYE: CPT

## 2021-07-30 PROCEDURE — 82553 CREATINE MB FRACTION: CPT | Performed by: INTERNAL MEDICINE

## 2021-07-30 PROCEDURE — 93005 ELECTROCARDIOGRAM TRACING: CPT | Performed by: INTERNAL MEDICINE

## 2021-07-30 RX ORDER — DILTIAZEM HCL IN NACL,ISO-OSM 125 MG/125
5-15 PLASTIC BAG, INJECTION (ML) INTRAVENOUS
Status: DISCONTINUED | OUTPATIENT
Start: 2021-07-30 | End: 2021-08-01 | Stop reason: HOSPADM

## 2021-07-30 RX ORDER — DILTIAZEM HYDROCHLORIDE 180 MG/1
180 CAPSULE, COATED, EXTENDED RELEASE ORAL DAILY
Status: DISCONTINUED | OUTPATIENT
Start: 2021-07-30 | End: 2021-08-01 | Stop reason: HOSPADM

## 2021-07-30 RX ADMIN — SODIUM CHLORIDE, SODIUM LACTATE, CALCIUM CHLORIDE, MAGNESIUM CHLORIDE AND DEXTROSE 2000 ML: 1.5; 538; 448; 18.3; 5.08 INJECTION, SOLUTION INTRAPERITONEAL at 17:12

## 2021-07-30 RX ADMIN — ALBUTEROL SULFATE 2.5 MG: 2.5 SOLUTION RESPIRATORY (INHALATION) at 07:02

## 2021-07-30 RX ADMIN — CALCITRIOL CAPSULES 0.25 MCG 0.5 MCG: 0.25 CAPSULE ORAL at 09:47

## 2021-07-30 RX ADMIN — DILTIAZEM HYDROCHLORIDE 180 MG: 180 CAPSULE, COATED, EXTENDED RELEASE ORAL at 09:49

## 2021-07-30 RX ADMIN — CALCIUM ACETATE 667 MG: 667 CAPSULE ORAL at 17:11

## 2021-07-30 RX ADMIN — CALCIUM ACETATE 667 MG: 667 CAPSULE ORAL at 09:49

## 2021-07-30 RX ADMIN — CETIRIZINE HYDROCHLORIDE 5 MG: 10 TABLET, FILM COATED ORAL at 09:49

## 2021-07-30 RX ADMIN — LEVOTHYROXINE SODIUM 175 MCG: 175 TABLET ORAL at 05:18

## 2021-07-30 RX ADMIN — BUDESONIDE 0.5 MG: 0.5 INHALANT ORAL at 19:42

## 2021-07-30 RX ADMIN — GABAPENTIN 100 MG: 100 CAPSULE ORAL at 20:44

## 2021-07-30 RX ADMIN — AMIODARONE HYDROCHLORIDE 200 MG: 200 TABLET ORAL at 09:47

## 2021-07-30 RX ADMIN — SODIUM CHLORIDE, PRESERVATIVE FREE 10 ML: 5 INJECTION INTRAVENOUS at 20:43

## 2021-07-30 RX ADMIN — ARFORMOTEROL TARTRATE 15 MCG: 15 SOLUTION RESPIRATORY (INHALATION) at 19:41

## 2021-07-30 RX ADMIN — ASPIRIN 81 MG: 81 TABLET, CHEWABLE ORAL at 09:49

## 2021-07-30 RX ADMIN — BUDESONIDE 0.5 MG: 0.5 INHALANT ORAL at 07:02

## 2021-07-30 RX ADMIN — DOCUSATE SODIUM 50MG AND SENNOSIDES 8.6MG 2 TABLET: 8.6; 5 TABLET, FILM COATED ORAL at 20:41

## 2021-07-30 RX ADMIN — SODIUM CHLORIDE, SODIUM LACTATE, CALCIUM CHLORIDE, MAGNESIUM CHLORIDE AND DEXTROSE 2000 ML: 1.5; 538; 448; 18.3; 5.08 INJECTION, SOLUTION INTRAPERITONEAL at 12:40

## 2021-07-30 RX ADMIN — MIDODRINE HYDROCHLORIDE 10 MG: 10 TABLET ORAL at 17:11

## 2021-07-30 RX ADMIN — DOCUSATE SODIUM 50MG AND SENNOSIDES 8.6MG 2 TABLET: 8.6; 5 TABLET, FILM COATED ORAL at 09:47

## 2021-07-30 RX ADMIN — METOPROLOL TARTRATE 12.5 MG: 25 TABLET, FILM COATED ORAL at 20:42

## 2021-07-30 RX ADMIN — ALBUTEROL SULFATE 2.5 MG: 2.5 SOLUTION RESPIRATORY (INHALATION) at 03:12

## 2021-07-30 RX ADMIN — SODIUM CHLORIDE, SODIUM LACTATE, CALCIUM CHLORIDE, MAGNESIUM CHLORIDE AND DEXTROSE 2000 ML: 1.5; 538; 448; 18.3; 5.08 INJECTION, SOLUTION INTRAPERITONEAL at 00:05

## 2021-07-30 RX ADMIN — METOPROLOL TARTRATE 5 MG: 1 INJECTION, SOLUTION INTRAVENOUS at 00:00

## 2021-07-30 RX ADMIN — Medication 10 MG/HR: at 01:27

## 2021-07-30 RX ADMIN — ALBUTEROL SULFATE 2.5 MG: 2.5 SOLUTION RESPIRATORY (INHALATION) at 19:42

## 2021-07-30 RX ADMIN — ALBUTEROL SULFATE 2.5 MG: 2.5 SOLUTION RESPIRATORY (INHALATION) at 11:11

## 2021-07-30 RX ADMIN — MIDODRINE HYDROCHLORIDE 10 MG: 10 TABLET ORAL at 12:39

## 2021-07-30 RX ADMIN — HYDROCODONE BITARTRATE AND ACETAMINOPHEN 2 TABLET: 7.5; 325 TABLET ORAL at 00:15

## 2021-07-30 RX ADMIN — PREDNISONE 10 MG: 10 TABLET ORAL at 09:48

## 2021-07-30 RX ADMIN — ONDANSETRON 4 MG: 2 INJECTION INTRAMUSCULAR; INTRAVENOUS at 00:00

## 2021-07-30 RX ADMIN — MIDODRINE HYDROCHLORIDE 10 MG: 10 TABLET ORAL at 09:48

## 2021-07-30 RX ADMIN — ARFORMOTEROL TARTRATE 15 MCG: 15 SOLUTION RESPIRATORY (INHALATION) at 07:02

## 2021-07-30 RX ADMIN — FAMOTIDINE 40 MG: 20 TABLET ORAL at 09:47

## 2021-07-30 RX ADMIN — CALCIUM ACETATE 667 MG: 667 CAPSULE ORAL at 12:40

## 2021-07-30 RX ADMIN — ALBUTEROL SULFATE 2.5 MG: 2.5 SOLUTION RESPIRATORY (INHALATION) at 14:57

## 2021-07-30 RX ADMIN — SODIUM CHLORIDE, PRESERVATIVE FREE 10 ML: 5 INJECTION INTRAVENOUS at 09:47

## 2021-07-30 RX ADMIN — Medication 5000 UNITS: at 09:48

## 2021-07-30 RX ADMIN — FINASTERIDE 5 MG: 5 TABLET, FILM COATED ORAL at 09:47

## 2021-07-30 RX ADMIN — METOPROLOL TARTRATE 12.5 MG: 25 TABLET, FILM COATED ORAL at 09:47

## 2021-07-30 RX ADMIN — MONTELUKAST 10 MG: 10 TABLET, FILM COATED ORAL at 09:49

## 2021-07-31 LAB
ANION GAP SERPL CALCULATED.3IONS-SCNC: 16.8 MMOL/L (ref 5–15)
BACTERIA SPEC AEROBE CULT: NORMAL
BACTERIA SPEC AEROBE CULT: NORMAL
BUN SERPL-MCNC: 47 MG/DL (ref 8–23)
BUN/CREAT SERPL: 6.4 (ref 7–25)
CALCIUM SPEC-SCNC: 9.3 MG/DL (ref 8.6–10.5)
CHLORIDE SERPL-SCNC: 89 MMOL/L (ref 98–107)
CO2 SERPL-SCNC: 23.2 MMOL/L (ref 22–29)
CREAT SERPL-MCNC: 7.32 MG/DL (ref 0.76–1.27)
DEPRECATED RDW RBC AUTO: 55.6 FL (ref 37–54)
ERYTHROCYTE [DISTWIDTH] IN BLOOD BY AUTOMATED COUNT: 14.9 % (ref 12.3–15.4)
GFR SERPL CREATININE-BSD FRML MDRD: 7 ML/MIN/1.73
GFR SERPL CREATININE-BSD FRML MDRD: ABNORMAL ML/MIN/{1.73_M2}
GLUCOSE BLDC GLUCOMTR-MCNC: 199 MG/DL (ref 70–99)
GLUCOSE SERPL-MCNC: 206 MG/DL (ref 65–99)
HCT VFR BLD AUTO: 30.8 % (ref 37.5–51)
HGB BLD-MCNC: 10.2 G/DL (ref 13–17.7)
MCH RBC QN AUTO: 34.2 PG (ref 26.6–33)
MCHC RBC AUTO-ENTMCNC: 33.1 G/DL (ref 31.5–35.7)
MCV RBC AUTO: 103.4 FL (ref 79–97)
PLATELET # BLD AUTO: 250 10*3/MM3 (ref 140–450)
PMV BLD AUTO: 9.4 FL (ref 6–12)
POTASSIUM SERPL-SCNC: 4.6 MMOL/L (ref 3.5–5.2)
RBC # BLD AUTO: 2.98 10*6/MM3 (ref 4.14–5.8)
SODIUM SERPL-SCNC: 129 MMOL/L (ref 136–145)
WBC # BLD AUTO: 12.15 10*3/MM3 (ref 3.4–10.8)

## 2021-07-31 PROCEDURE — 82962 GLUCOSE BLOOD TEST: CPT

## 2021-07-31 PROCEDURE — 94799 UNLISTED PULMONARY SVC/PX: CPT

## 2021-07-31 PROCEDURE — 85027 COMPLETE CBC AUTOMATED: CPT | Performed by: INTERNAL MEDICINE

## 2021-07-31 PROCEDURE — 80048 BASIC METABOLIC PNL TOTAL CA: CPT | Performed by: INTERNAL MEDICINE

## 2021-07-31 RX ADMIN — ALBUTEROL SULFATE 2.5 MG: 2.5 SOLUTION RESPIRATORY (INHALATION) at 14:44

## 2021-07-31 RX ADMIN — CALCITRIOL CAPSULES 0.25 MCG 0.5 MCG: 0.25 CAPSULE ORAL at 09:56

## 2021-07-31 RX ADMIN — CALCIUM ACETATE 667 MG: 667 CAPSULE ORAL at 12:11

## 2021-07-31 RX ADMIN — METOPROLOL TARTRATE 12.5 MG: 25 TABLET, FILM COATED ORAL at 20:23

## 2021-07-31 RX ADMIN — FAMOTIDINE 40 MG: 20 TABLET ORAL at 09:56

## 2021-07-31 RX ADMIN — FINASTERIDE 5 MG: 5 TABLET, FILM COATED ORAL at 09:57

## 2021-07-31 RX ADMIN — SODIUM CHLORIDE, SODIUM LACTATE, CALCIUM CHLORIDE, MAGNESIUM CHLORIDE AND DEXTROSE 2000 ML: 1.5; 538; 448; 18.3; 5.08 INJECTION, SOLUTION INTRAPERITONEAL at 12:11

## 2021-07-31 RX ADMIN — BUDESONIDE 0.5 MG: 0.5 INHALANT ORAL at 06:32

## 2021-07-31 RX ADMIN — DILTIAZEM HYDROCHLORIDE 180 MG: 180 CAPSULE, COATED, EXTENDED RELEASE ORAL at 09:57

## 2021-07-31 RX ADMIN — METOPROLOL TARTRATE 12.5 MG: 25 TABLET, FILM COATED ORAL at 09:56

## 2021-07-31 RX ADMIN — CALCIUM ACETATE 667 MG: 667 CAPSULE ORAL at 17:31

## 2021-07-31 RX ADMIN — ASPIRIN 81 MG: 81 TABLET, CHEWABLE ORAL at 09:56

## 2021-07-31 RX ADMIN — Medication 5000 UNITS: at 09:57

## 2021-07-31 RX ADMIN — MIDODRINE HYDROCHLORIDE 10 MG: 10 TABLET ORAL at 12:11

## 2021-07-31 RX ADMIN — CETIRIZINE HYDROCHLORIDE 5 MG: 10 TABLET, FILM COATED ORAL at 09:56

## 2021-07-31 RX ADMIN — AMIODARONE HYDROCHLORIDE 200 MG: 200 TABLET ORAL at 09:57

## 2021-07-31 RX ADMIN — SODIUM CHLORIDE, PRESERVATIVE FREE 10 ML: 5 INJECTION INTRAVENOUS at 20:24

## 2021-07-31 RX ADMIN — ALBUTEROL SULFATE 2.5 MG: 2.5 SOLUTION RESPIRATORY (INHALATION) at 18:20

## 2021-07-31 RX ADMIN — ALBUTEROL SULFATE 2.5 MG: 2.5 SOLUTION RESPIRATORY (INHALATION) at 06:32

## 2021-07-31 RX ADMIN — LEVOTHYROXINE SODIUM 175 MCG: 175 TABLET ORAL at 06:38

## 2021-07-31 RX ADMIN — BUDESONIDE 0.5 MG: 0.5 INHALANT ORAL at 18:20

## 2021-07-31 RX ADMIN — ARFORMOTEROL TARTRATE 15 MCG: 15 SOLUTION RESPIRATORY (INHALATION) at 06:32

## 2021-07-31 RX ADMIN — GABAPENTIN 100 MG: 100 CAPSULE ORAL at 09:58

## 2021-07-31 RX ADMIN — DOCUSATE SODIUM 50MG AND SENNOSIDES 8.6MG 2 TABLET: 8.6; 5 TABLET, FILM COATED ORAL at 20:23

## 2021-07-31 RX ADMIN — CALCIUM ACETATE 667 MG: 667 CAPSULE ORAL at 09:57

## 2021-07-31 RX ADMIN — MONTELUKAST 10 MG: 10 TABLET, FILM COATED ORAL at 09:57

## 2021-07-31 RX ADMIN — MIDODRINE HYDROCHLORIDE 10 MG: 10 TABLET ORAL at 09:56

## 2021-07-31 RX ADMIN — ALBUTEROL SULFATE 2.5 MG: 2.5 SOLUTION RESPIRATORY (INHALATION) at 00:00

## 2021-07-31 RX ADMIN — ALBUTEROL SULFATE 2.5 MG: 2.5 SOLUTION RESPIRATORY (INHALATION) at 02:56

## 2021-07-31 RX ADMIN — SODIUM CHLORIDE, SODIUM LACTATE, CALCIUM CHLORIDE, MAGNESIUM CHLORIDE AND DEXTROSE 2000 ML: 1.5; 538; 448; 18.3; 5.08 INJECTION, SOLUTION INTRAPERITONEAL at 00:09

## 2021-07-31 RX ADMIN — MIDODRINE HYDROCHLORIDE 10 MG: 10 TABLET ORAL at 17:31

## 2021-07-31 RX ADMIN — GABAPENTIN 100 MG: 100 CAPSULE ORAL at 20:24

## 2021-07-31 RX ADMIN — SODIUM CHLORIDE, SODIUM LACTATE, CALCIUM CHLORIDE, MAGNESIUM CHLORIDE AND DEXTROSE 2000 ML: 1.5; 538; 448; 18.3; 5.08 INJECTION, SOLUTION INTRAPERITONEAL at 17:32

## 2021-07-31 RX ADMIN — SODIUM CHLORIDE, SODIUM LACTATE, CALCIUM CHLORIDE, MAGNESIUM CHLORIDE AND DEXTROSE 2000 ML: 1.5; 538; 448; 18.3; 5.08 INJECTION, SOLUTION INTRAPERITONEAL at 23:57

## 2021-07-31 RX ADMIN — SODIUM CHLORIDE, PRESERVATIVE FREE 10 ML: 5 INJECTION INTRAVENOUS at 09:55

## 2021-07-31 RX ADMIN — ARFORMOTEROL TARTRATE 15 MCG: 15 SOLUTION RESPIRATORY (INHALATION) at 18:20

## 2021-07-31 RX ADMIN — ALBUTEROL SULFATE 2.5 MG: 2.5 SOLUTION RESPIRATORY (INHALATION) at 23:02

## 2021-08-01 VITALS
TEMPERATURE: 98.42 F | BODY MASS INDEX: 28.72 KG/M2 | HEIGHT: 74 IN | RESPIRATION RATE: 18 BRPM | WEIGHT: 223.77 LBS | OXYGEN SATURATION: 92 % | DIASTOLIC BLOOD PRESSURE: 59 MMHG | SYSTOLIC BLOOD PRESSURE: 118 MMHG | HEART RATE: 100 BPM

## 2021-08-01 LAB — QT INTERVAL: 452 MS

## 2021-08-01 PROCEDURE — 94799 UNLISTED PULMONARY SVC/PX: CPT

## 2021-08-01 PROCEDURE — 97110 THERAPEUTIC EXERCISES: CPT

## 2021-08-01 RX ORDER — LEVOTHYROXINE SODIUM 0.2 MG/1
200 TABLET ORAL DAILY
Qty: 30 TABLET | Refills: 1 | Status: SHIPPED | OUTPATIENT
Start: 2021-08-01 | End: 2021-09-30

## 2021-08-01 RX ADMIN — CALCITRIOL CAPSULES 0.25 MCG 0.5 MCG: 0.25 CAPSULE ORAL at 09:22

## 2021-08-01 RX ADMIN — METOPROLOL TARTRATE 12.5 MG: 25 TABLET, FILM COATED ORAL at 08:31

## 2021-08-01 RX ADMIN — LEVOTHYROXINE SODIUM 175 MCG: 175 TABLET ORAL at 05:26

## 2021-08-01 RX ADMIN — SODIUM CHLORIDE, PRESERVATIVE FREE 10 ML: 5 INJECTION INTRAVENOUS at 08:33

## 2021-08-01 RX ADMIN — FINASTERIDE 5 MG: 5 TABLET, FILM COATED ORAL at 09:22

## 2021-08-01 RX ADMIN — DILTIAZEM HYDROCHLORIDE 180 MG: 180 CAPSULE, COATED, EXTENDED RELEASE ORAL at 09:22

## 2021-08-01 RX ADMIN — GABAPENTIN 100 MG: 100 CAPSULE ORAL at 08:32

## 2021-08-01 RX ADMIN — BUDESONIDE 0.5 MG: 0.5 INHALANT ORAL at 06:30

## 2021-08-01 RX ADMIN — FAMOTIDINE 40 MG: 20 TABLET ORAL at 08:31

## 2021-08-01 RX ADMIN — ARFORMOTEROL TARTRATE 15 MCG: 15 SOLUTION RESPIRATORY (INHALATION) at 06:29

## 2021-08-01 RX ADMIN — ASPIRIN 81 MG: 81 TABLET, CHEWABLE ORAL at 08:33

## 2021-08-01 RX ADMIN — MIDODRINE HYDROCHLORIDE 10 MG: 10 TABLET ORAL at 08:36

## 2021-08-01 RX ADMIN — ALBUTEROL SULFATE 2.5 MG: 2.5 SOLUTION RESPIRATORY (INHALATION) at 03:41

## 2021-08-01 RX ADMIN — ALBUTEROL SULFATE 2.5 MG: 2.5 SOLUTION RESPIRATORY (INHALATION) at 06:29

## 2021-08-01 RX ADMIN — HYDROCODONE BITARTRATE AND ACETAMINOPHEN 2 TABLET: 7.5; 325 TABLET ORAL at 09:22

## 2021-08-01 RX ADMIN — Medication 5000 UNITS: at 09:22

## 2021-08-01 RX ADMIN — AMIODARONE HYDROCHLORIDE 200 MG: 200 TABLET ORAL at 08:32

## 2021-08-01 RX ADMIN — SODIUM CHLORIDE, SODIUM LACTATE, CALCIUM CHLORIDE, MAGNESIUM CHLORIDE AND DEXTROSE 2000 ML: 1.5; 538; 448; 18.3; 5.08 INJECTION, SOLUTION INTRAPERITONEAL at 06:00

## 2021-08-01 RX ADMIN — MONTELUKAST 10 MG: 10 TABLET, FILM COATED ORAL at 09:22

## 2021-08-01 RX ADMIN — CETIRIZINE HYDROCHLORIDE 5 MG: 10 TABLET, FILM COATED ORAL at 08:32

## 2021-08-01 RX ADMIN — ALBUTEROL SULFATE 2.5 MG: 2.5 SOLUTION RESPIRATORY (INHALATION) at 11:15

## 2021-08-01 RX ADMIN — CALCIUM ACETATE 667 MG: 667 CAPSULE ORAL at 08:30

## 2021-08-01 NOTE — DISCHARGE INSTR - APPOINTMENTS
Please call and follow up with Nephrology/Fanny in 1 week. Unable to schedule appouintment before discharge due to office being closed on Sunday. Office number: [phone]

## 2021-08-01 NOTE — THERAPY TREATMENT NOTE
Acute Care - Physical Therapy Treatment Note  MEREDITH Denise     Patient Name: Cristhian Watts  : 1935  MRN: [id number]  Today's Date: 2021      Visit Dx:     ICD-10-CM ICD-9-CM   1. NSTEMI (non-ST elevated myocardial infarction) (CMS/[facility])  I21.4 410.70   2. Generalized weakness  R53.1 780.79   3. Chronic renal failure, stage 5 (CMS/[facility])  N18.5 585.5   4. Difficulty in walking  R26.2 719.7   5. Decreased activities of daily living (ADL)  Z78.9 V49.89     Patient Active Problem List   Diagnosis   • Acute bronchitis   • Anemia   • End-stage renal disease (CMS/[facility])   • Paroxysmal atrial fibrillation (CMS/[facility])   • Benign hypertensive heart disease without congestive heart failure   • 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 (CMS/[facility])   • Chronic obstructive pulmonary disease with acute lower respiratory infection (CMS/[facility])   • Congestive heart failure (CMS/[facility])   • Elevated prostate specific antigen (PSA)   • Encounter for immunization   • Essential hypertension   • Ex-smoker   • Gastroesophageal reflux disease   • Gout   • Hereditary and idiopathic neuropathy, unspecified   • Hypertrophy of prostate without urinary obstruction and other lower urinary tract symptoms (LUTS)   • Type 2 diabetes mellitus without complication (CMS/[facility])   • Kidney failure   • Mild intermittent asthma   • Mixed hyperlipidemia   • Multiple myeloma in remission (CMS/[facility])   • 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   • Obesity   • Obstructive sleep apnea   • Osteoporosis   • Overweight with body mass index (BMI) 25.0-29.9   • Pleural effusion due to bacterial infection   • Postnasal drip   • Proteinuria   • Shortness of breath   • Thyrotoxicosis   • Vitamin D deficiency   • Troponin level elevated   • Generalized weakness   • Presence of cardiac pacemaker   • Other specified hypothyroidism     Past Medical History:   Diagnosis Date   • Basal cell carcinoma     REMOVED FROM FACE   • CHF (congestive heart  failure) (CMS/[facility])     LAST EPISODE 8/2020   • Condition not found     BILATERAL HEARING AIDS   • Condition not found     CARDIAC ABLIATION 3/2018, FOLLOWS TYRA KNIGHT   • CPAP (continuous positive airway pressure) dependence    • Diabetes (CMS/[facility])     TYPE 2, BS IN THE -110   • Fistula     LEFT ARM, TUES&SAT   • Hyperlipidemia    • Hypertension     CONTROLLED WITH MEDS   • Hypothyroidism    • Myeloma (CMS/[facility])     MULTIPLE, REMISSION SINCE 2008   • Pacemaker     2018, WATCHMAN DEVICE AFTER PACEMAKER   • Paroxysmal A-fib (CMS/[facility])    • Renal disease     END STAGE   • Skin cancer of nose    • Troponin level elevated 7/26/2021   • Tuberculin skin 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     NEG   • Uses nebulizer and inhaler at home    • Wears glasses      Past Surgical History:   Procedure Laterality Date   • ARTERIOVENOUS FISTULA      LEFT ARM   • CARDIOVERSION      IN MAY 2018   • CATARACT EXTRACTION      BOTH EYES   • HEMORRHOIDECTOMY     • OTHER SURGICAL HISTORY      DENTAL IMPLANT   • OTHER SURGICAL HISTORY      ABLASION 3/2018   • PACEMAKER IMPLANTATION          PT Assessment (last 12 hours)      PT Evaluation and Treatment     Row Name 08/01/21 1009          Physical Therapy Time and Intention    Subjective Information  complains of;weakness  -DF     Document Type  therapy note (daily note)  -DF     Mode of Treatment  individual therapy;physical therapy  -DF     Total Minutes, Physical Therapy  11  -DF     Patient Effort  adequate  -DF     Row Name 08/01/21 1009          Motor Skills    Motor Skills  therapeutic exercise  -DF     Therapeutic Exercise  hip;knee;ankle  -DF     Row Name 08/01/21 1009          Hip (Therapeutic Exercise)    Hip (Therapeutic Exercise)  strengthening exercise  -DF     Hip Strengthening (Therapeutic Exercise)  bilateral;supine;heel slides;10 repetitions;2 sets;flexion;extension;aBduction;aDduction  -DF     Row Name 08/01/21 1009          Knee (Therapeutic Exercise)    Knee (Therapeutic Exercise)   strengthening exercise  -DF     Knee Strengthening (Therapeutic Exercise)  bilateral;heel slides;SAQ (short arc quad);supine;SLR (straight leg raise);10 repetitions;2 sets  -DF     Row Name 08/01/21 1009          Ankle (Therapeutic Exercise)    Ankle (Therapeutic Exercise)  AROM (active range of motion)  -DF     Ankle AROM (Therapeutic Exercise)  bilateral;dorsiflexion;plantarflexion;10 repetitions;2 sets  -DF     Row Name 08/01/21 1009          Progress Summary (PT)    Progress Toward Functional Goals (PT)  progress toward functional goals is gradual  -DF     Daily Progress Summary (PT)  P tated he is to go home today, has home health PT  -DF       User Key  (r) = Recorded By, (t) = Taken By, (c) = Cosigned By    Initials Name Provider Type    Ina Campbell PTA Physical Therapy Assistant        Physical Therapy Education                 Title: PT OT SLP Therapies (Resolved)     Topic: Physical Therapy (Resolved)     Point: Mobility training (Resolved)     Learning Progress Summary           Patient Acceptance, E, VU by  at 7/31/2021 2307    Acceptance, E, VU by  at 7/31/2021 0533    Acceptance, E,TB, VU,DU,NR by CG at 7/30/2021 0054    Acceptance, E,TB, VU,DU by CG at 7/29/2021 0328    Acceptance, E, DU by CYNDI at 7/27/2021 1118   Family Acceptance, E, VU by  at 7/31/2021 2307    Acceptance, E, VU by  at 7/31/2021 0533                   Point: Home exercise program (Resolved)     Learning Progress Summary           Patient Acceptance, E, VU by  at 7/31/2021 2307    Acceptance, E, VU by  at 7/31/2021 0533    Acceptance, E,TB, VU,DU,NR by CG at 7/30/2021 0054    Acceptance, E,TB, VU,DU by CG at 7/29/2021 0328    Acceptance, E, DU by CYNDI at 7/27/2021 1118   Family Acceptance, E, VU by  at 7/31/2021 2307    Acceptance, E, VU by  at 7/31/2021 0533                   Point: Body mechanics (Resolved)     Learning Progress Summary           Patient Acceptance, E, VU by  at 7/31/2021 2307    Acceptance,  E, VU by  at 7/31/2021 0533    Acceptance, E,TB, VU,DU,NR by CG at 7/30/2021 0054    Acceptance, E,TB, VU,DU by CG at 7/29/2021 0328    Acceptance, E, DU by CYNDI at 7/27/2021 1118   Family Acceptance, E, VU by  at 7/31/2021 2307    Acceptance, E, VU by  at 7/31/2021 0533                   Point: Precautions (Resolved)     Learning Progress Summary           Patient Acceptance, E, VU by  at 7/31/2021 2307    Acceptance, E, VU by  at 7/31/2021 0533    Acceptance, E,TB, VU,DU,NR by CG at 7/30/2021 0054    Acceptance, E,TB, VU,DU by  at 7/29/2021 0328    Acceptance, E, DU by CYNDI at 7/27/2021 1118   Family Acceptance, E, VU by  at 7/31/2021 2307    Acceptance, E, VU by  at 7/31/2021 0533                               User Key     Initials Effective Dates Name Provider Type Discipline     06/16/21 -  Cheyanne Gresham, RN Registered Nurse Nurse    CG 06/16/21 -  Gabehart, Cassidy, RN Registered Nurse Nurse    CYNDI 06/03/21 -  Clemente Smart PT Physical Therapist PT              PT Recommendation and Plan     Progress Summary (PT)  Progress Toward Functional Goals (PT): progress toward functional goals is gradual  Daily Progress Summary (PT): P tated he is to go home today, has home health PT       Time Calculation:   PT Charges     Row Name 08/01/21 1009             Time Calculation    PT Received On  08/01/21  -DF      PT Goal Re-Cert Due Date  08/05/21  -DF         Timed Charges    10889 - PT Therapeutic Exercise Minutes  11  -DF         Total Minutes    Timed Charges Total Minutes  11  -DF       Total Minutes  11  -DF        User Key  (r) = Recorded By, (t) = Taken By, (c) = Cosigned By    Initials Name Provider Type    DF Ina Schneider PTA Physical Therapy Assistant        Therapy Charges for Today     Code Description Service Date Service Provider Modifiers Qty    27898660603 HC PT THER PROC EA 15 MIN 8/1/2021 Ina Schneider PTA GP 1          PT G-Codes  Outcome Measure Options: AM-PAC 6 Clicks Daily  Activity (OT), Optimal Instrument  AM-PAC 6 Clicks Score (PT): 13  AM-PAC 6 Clicks Score (OT): 16    Ina Schneider, PTA  8/1/2021

## 2021-08-01 NOTE — PLAN OF CARE
Goal Outcome Evaluation:      Has remained in controlled Afib. Confusion is much improved. He has been more alert and able to get up and walk to bathroom. Tolerating PD well. Wife at bedside. Vitals stable. Cheyanne Gresham RN

## 2021-08-01 NOTE — DISCHARGE SUMMARY
[facility]   DISCHARGE SUMMARY    Patient Name: Cristhian Watts  : 1935  MRN: [id number]    Date of Admission: 2021  Date of Discharge: 2021  Primary Care Physician: Dave Escobedo MD    Consults     Date and Time Order Name Status Description    2021  4:57 PM Inpatient Cardiology Consult Completed     2021  4:46 PM Cardiology (on-call MD unless specified) Completed     2021  4:46 PM Nephrology  (on-call MD unless specified) Completed           Hospital Course     Presenting Problem:   NSTEMI (non-ST elevated myocardial infarction) (CMS/[facility]) [I21.4]  Generalized weakness [R53.1]  Chronic renal failure, stage 5 (CMS/[facility]) [N18.5]    Active and resolved problems  Principal Problem:    Generalized weakness  Overview:  Active Problems:    End-stage renal disease (CMS/[facility])  Overview:    Paroxysmal atrial fibrillation (CMS/[facility])  Overview:    Essential hypertension  Overview:    Troponin level elevated  Overview:    Presence of cardiac pacemaker  Overview:    Other specified hypothyroidism  Overview:  Resolved Problems:    * No resolved hospital problems. *       Hospital Course:  Cristhian Watts is a 85 y.o. male with past medical history significant for ESRD hypothyroidism chronic atrial fibrillation chronic diastolic congestive heart failure was admitted with severe weakness chest pain.  Patient's troponin was not indicative of acute myocardial infarction.  Patient was evaluated by cardiology service.  His TSH was up to 25 so we have increase Synthroid dose to 200 MCG.  Patient has been extremely weak to a point where he has difficulty ambulating even few yards and patient was supposed to go to rehab however insurance company has denied it.  Patient is little bit better but he still need rehab but as insurance has denied this patient is decided to discharge him home and then we will try to place him into rehab as outpatient.  Patient is clinically stable so he will be  discharged home.  His Synthroid dose has been increased to 200 MCG.  His heart rate is under better control      Vital Signs:  Temp:  [97.34 °F (36.3 °C)-98.42 °F (36.9 °C)] 98.42 °F (36.9 °C)  Heart Rate:  [] 108  Resp:  [16-18] 16  BP: ()/(43-70) 118/59      Discharge Details        Discharge Medications      Changes to Medications      Instructions Start Date   levothyroxine 200 MCG tablet  Commonly known as: SYNTHROID, LEVOTHROID  What changed:   medication strength  how much to take  when to take this   200 mcg, Oral, Daily         Continue These Medications      Instructions Start Date   amiodarone 200 MG tablet  Commonly known as: PACERONE   200 mg, Oral, Daily      calcitriol 0.5 MCG capsule  Commonly known as: ROCALTROL   0.5 mcg, Oral, Daily      calcium acetate 667 MG capsule capsule  Commonly known as: PHOS BINDER)   667 mg, Oral, 3 Times Daily      cetirizine 10 MG tablet  Commonly known as: zyrTEC   10 mg, Oral, Daily      dilTIAZem  MG 24 hr capsule  Commonly known as: CARDIZEM CD   120 mg, Oral, Daily      docusate sodium 100 MG capsule  Commonly known as: COLACE   100 mg, Oral, 2 Times Daily      finasteride 5 MG tablet  Commonly known as: PROSCAR   5 mg, Oral, Every Night at Bedtime      gabapentin 100 MG capsule  Commonly known as: NEURONTIN   100 mg, Oral, 2 Times Daily      metoprolol tartrate 25 MG tablet  Commonly known as: LOPRESSOR   12.5 mg, Oral, 2 Times Daily      midodrine 5 MG tablet  Commonly known as: PROAMATINE   10 mg, Oral, 2 Times Daily With Meals      montelukast 10 MG tablet  Commonly known as: SINGULAIR   10 mg, Oral, Daily      pantoprazole 40 MG EC tablet  Commonly known as: PROTONIX   40 mg, Oral, Daily      potassium chloride 10 MEQ CR capsule  Commonly known as: MICRO-K   10 mEq, Oral, 2 Times Daily      predniSONE 10 MG tablet  Commonly known as: DELTASONE   10 mg, Oral, Every Other Day, Pt takes Monday, Wed, Fri      Symbicort 160-4.5 MCG/ACT  inhaler  Generic drug: budesonide-formoterol   2 puffs, Inhalation, 2 Times Daily - RT      Ventolin  (90 Base) MCG/ACT inhaler  Generic drug: albuterol sulfate HFA   Ventolin HFA 90 mcg/actuation inhalation HFA aerosol inhaler inhale 1 puff (90 mcg) by inhalation route every 6 hours as needed   Suspended      vitamin D3 125 MCG (5000 UT) capsule capsule   5,000 Units, Oral, Daily             Allergies   Allergen Reactions   • Oxycodone Other (See Comments)     DOESN'T LIKE FEELING    • Renagel [Sevelamer] Unknown - Low Severity         Discharge Disposition:  Home or Self Care    Diet:        Discharge Activity:         CODE STATUS:    Code Status and Medical Interventions:   Ordered at: 07/26/21 1226     Code Status:    CPR     Medical Interventions (Level of Support Prior to Arrest):    Full         Future Appointments   Date Time Provider Department Center   11/8/2021 10:00 AM [facility] [place] DEVICE CHECK [facility]   11/8/2021 10:15 AM Christian Ocasio MD [facility]           Time spent on Discharge including face to face service:  30  minutes    Electronically signed by Dave Escobedo MD, 08/01/21, 10:53 AM EDT.

## 2021-08-02 ENCOUNTER — READMISSION MANAGEMENT (OUTPATIENT)
Dept: CALL CENTER | Facility: HOSPITAL | Age: 86
End: 2021-08-02

## 2021-08-18 ENCOUNTER — APPOINTMENT (OUTPATIENT)
Dept: GENERAL RADIOLOGY | Facility: HOSPITAL | Age: 86
End: 2021-08-18

## 2021-08-18 ENCOUNTER — HOSPITAL ENCOUNTER (INPATIENT)
Facility: HOSPITAL | Age: 86
LOS: 2 days | Discharge: HOME | End: 2021-08-21
Attending: EMERGENCY MEDICINE | Admitting: INTERNAL MEDICINE

## 2021-08-18 DIAGNOSIS — E83.42 HYPOMAGNESEMIA: ICD-10-CM

## 2021-08-18 DIAGNOSIS — R26.2 DIFFICULTY WALKING: ICD-10-CM

## 2021-08-18 DIAGNOSIS — R53.1 WEAKNESS: Primary | ICD-10-CM

## 2021-08-18 DIAGNOSIS — N18.9 CHRONIC RENAL FAILURE, UNSPECIFIED CKD STAGE: ICD-10-CM

## 2021-08-18 DIAGNOSIS — I95.9 TRANSIENT HYPOTENSION: ICD-10-CM

## 2021-08-18 DIAGNOSIS — Z78.9 DECREASED ACTIVITIES OF DAILY LIVING (ADL): ICD-10-CM

## 2021-08-18 DIAGNOSIS — I48.91 ATRIAL FIBRILLATION WITH CONTROLLED VENTRICULAR RESPONSE (HCC): ICD-10-CM

## 2021-08-18 DIAGNOSIS — D64.9 CHRONIC ANEMIA: ICD-10-CM

## 2021-08-18 LAB
ALBUMIN SERPL-MCNC: 2.2 G/DL (ref 3.5–5.2)
ALBUMIN/GLOB SERPL: 0.6 G/DL
ALP SERPL-CCNC: 285 U/L (ref 39–117)
ALT SERPL W P-5'-P-CCNC: 36 U/L (ref 1–41)
ANION GAP SERPL CALCULATED.3IONS-SCNC: 14.7 MMOL/L (ref 5–15)
APPEARANCE FLD: CLEAR
AST SERPL-CCNC: 78 U/L (ref 1–40)
BACTERIA UR QL AUTO: ABNORMAL /HPF
BASOPHILS # BLD AUTO: 0.05 10*3/MM3 (ref 0–0.2)
BASOPHILS NFR BLD AUTO: 0.5 % (ref 0–1.5)
BILIRUB SERPL-MCNC: 0.5 MG/DL (ref 0–1.2)
BILIRUB UR QL STRIP: NEGATIVE
BUN SERPL-MCNC: 31 MG/DL (ref 8–23)
BUN/CREAT SERPL: 5 (ref 7–25)
CALCIUM SPEC-SCNC: 9.6 MG/DL (ref 8.6–10.5)
CHLORIDE SERPL-SCNC: 96 MMOL/L (ref 98–107)
CK MB SERPL-CCNC: 1.24 NG/ML
CK SERPL-CCNC: 22 U/L (ref 20–200)
CLARITY UR: CLEAR
CO2 SERPL-SCNC: 19.3 MMOL/L (ref 22–29)
COLOR FLD: NORMAL
COLOR UR: YELLOW
CREAT SERPL-MCNC: 6.2 MG/DL (ref 0.76–1.27)
DEPRECATED RDW RBC AUTO: 53.7 FL (ref 37–54)
EOSINOPHIL # BLD AUTO: 0.19 10*3/MM3 (ref 0–0.4)
EOSINOPHIL NFR BLD AUTO: 1.9 % (ref 0.3–6.2)
EOSINOPHIL NFR FLD MANUAL: 12 %
ERYTHROCYTE [DISTWIDTH] IN BLOOD BY AUTOMATED COUNT: 14.7 % (ref 12.3–15.4)
FLUAV AG NPH QL: NEGATIVE
FLUBV AG NPH QL IA: NEGATIVE
GFR SERPL CREATININE-BSD FRML MDRD: 9 ML/MIN/1.73
GFR SERPL CREATININE-BSD FRML MDRD: ABNORMAL ML/MIN/{1.73_M2}
GLOBULIN UR ELPH-MCNC: 3.9 GM/DL
GLUCOSE BLDC GLUCOMTR-MCNC: 104 MG/DL (ref 70–99)
GLUCOSE SERPL-MCNC: 89 MG/DL (ref 65–99)
GLUCOSE UR STRIP-MCNC: NEGATIVE MG/DL
HCT VFR BLD AUTO: 31.6 % (ref 37.5–51)
HGB BLD-MCNC: 10.5 G/DL (ref 13–17.7)
HGB UR QL STRIP.AUTO: NEGATIVE
HOLD SPECIMEN: NORMAL
HYALINE CASTS UR QL AUTO: ABNORMAL /LPF
IMM GRANULOCYTES # BLD AUTO: 0.11 10*3/MM3 (ref 0–0.05)
IMM GRANULOCYTES NFR BLD AUTO: 1.1 % (ref 0–0.5)
KETONES UR QL STRIP: NEGATIVE
LEUKOCYTE ESTERASE UR QL STRIP.AUTO: ABNORMAL
LIPASE SERPL-CCNC: 53 U/L (ref 13–60)
LYMPHOCYTES # BLD AUTO: 1.71 10*3/MM3 (ref 0.7–3.1)
LYMPHOCYTES NFR BLD AUTO: 16.8 % (ref 19.6–45.3)
LYMPHOCYTES NFR FLD MANUAL: 20 %
MACROPHAGE FLUID: 28 %
MAGNESIUM SERPL-MCNC: 1.4 MG/DL (ref 1.6–2.4)
MCH RBC QN AUTO: 33.9 PG (ref 26.6–33)
MCHC RBC AUTO-ENTMCNC: 33.2 G/DL (ref 31.5–35.7)
MCV RBC AUTO: 101.9 FL (ref 79–97)
MONOCYTES # BLD AUTO: 0.74 10*3/MM3 (ref 0.1–0.9)
MONOCYTES NFR BLD AUTO: 7.3 % (ref 5–12)
MONOCYTES NFR FLD: 36 %
NEUTROPHILS NFR BLD AUTO: 7.37 10*3/MM3 (ref 1.7–7)
NEUTROPHILS NFR BLD AUTO: 72.4 % (ref 42.7–76)
NEUTROPHILS NFR FLD MANUAL: 4 %
NITRITE UR QL STRIP: NEGATIVE
NRBC BLD AUTO-RTO: 0 /100 WBC (ref 0–0.2)
NT-PROBNP SERPL-MCNC: ABNORMAL PG/ML (ref 0–1800)
NUC CELL # FLD: 17 /MM3
PH UR STRIP.AUTO: 8.5 [PH] (ref 5–8)
PLATELET # BLD AUTO: 237 10*3/MM3 (ref 140–450)
PMV BLD AUTO: 9 FL (ref 6–12)
POTASSIUM SERPL-SCNC: 4.5 MMOL/L (ref 3.5–5.2)
PROT SERPL-MCNC: 6.1 G/DL (ref 6–8.5)
PROT UR QL STRIP: ABNORMAL
RBC # BLD AUTO: 3.1 10*6/MM3 (ref 4.14–5.8)
RBC # FLD AUTO: <2000 /MM3
RBC # UR: ABNORMAL /HPF
REF LAB TEST METHOD: ABNORMAL
SODIUM SERPL-SCNC: 130 MMOL/L (ref 136–145)
SP GR UR STRIP: 1.01 (ref 1–1.03)
SQUAMOUS #/AREA URNS HPF: ABNORMAL /HPF
T4 FREE SERPL-MCNC: 1.42 NG/DL (ref 0.93–1.7)
TROPONIN I SERPL-MCNC: 0.06 NG/ML (ref 0–0.6)
TROPONIN I SERPL-MCNC: 0.06 NG/ML (ref 0–0.6)
TSH SERPL DL<=0.05 MIU/L-ACNC: 41.23 UIU/ML (ref 0.27–4.2)
UROBILINOGEN UR QL STRIP: ABNORMAL
WBC # BLD AUTO: 10.17 10*3/MM3 (ref 3.4–10.8)
WBC UR QL AUTO: ABNORMAL /HPF
WHOLE BLOOD HOLD SPECIMEN: NORMAL

## 2021-08-18 PROCEDURE — 82553 CREATINE MB FRACTION: CPT | Performed by: EMERGENCY MEDICINE

## 2021-08-18 PROCEDURE — 83735 ASSAY OF MAGNESIUM: CPT | Performed by: EMERGENCY MEDICINE

## 2021-08-18 PROCEDURE — 89051 BODY FLUID CELL COUNT: CPT | Performed by: EMERGENCY MEDICINE

## 2021-08-18 PROCEDURE — 82550 ASSAY OF CK (CPK): CPT | Performed by: EMERGENCY MEDICINE

## 2021-08-18 PROCEDURE — 87086 URINE CULTURE/COLONY COUNT: CPT | Performed by: EMERGENCY MEDICINE

## 2021-08-18 PROCEDURE — 87804 INFLUENZA ASSAY W/OPTIC: CPT | Performed by: EMERGENCY MEDICINE

## 2021-08-18 PROCEDURE — 85025 COMPLETE CBC W/AUTO DIFF WBC: CPT

## 2021-08-18 PROCEDURE — 71045 X-RAY EXAM CHEST 1 VIEW: CPT

## 2021-08-18 PROCEDURE — 99285 EMERGENCY DEPT VISIT HI MDM: CPT

## 2021-08-18 PROCEDURE — 84439 ASSAY OF FREE THYROXINE: CPT | Performed by: EMERGENCY MEDICINE

## 2021-08-18 PROCEDURE — 84484 ASSAY OF TROPONIN QUANT: CPT

## 2021-08-18 PROCEDURE — 83690 ASSAY OF LIPASE: CPT | Performed by: EMERGENCY MEDICINE

## 2021-08-18 PROCEDURE — 93005 ELECTROCARDIOGRAM TRACING: CPT | Performed by: EMERGENCY MEDICINE

## 2021-08-18 PROCEDURE — 83880 ASSAY OF NATRIURETIC PEPTIDE: CPT | Performed by: EMERGENCY MEDICINE

## 2021-08-18 PROCEDURE — 82962 GLUCOSE BLOOD TEST: CPT

## 2021-08-18 PROCEDURE — 81001 URINALYSIS AUTO W/SCOPE: CPT | Performed by: EMERGENCY MEDICINE

## 2021-08-18 PROCEDURE — 93010 ELECTROCARDIOGRAM REPORT: CPT | Performed by: INTERNAL MEDICINE

## 2021-08-18 PROCEDURE — 84443 ASSAY THYROID STIM HORMONE: CPT | Performed by: EMERGENCY MEDICINE

## 2021-08-18 PROCEDURE — 87635 SARS-COV-2 COVID-19 AMP PRB: CPT | Performed by: EMERGENCY MEDICINE

## 2021-08-18 PROCEDURE — 93005 ELECTROCARDIOGRAM TRACING: CPT

## 2021-08-18 PROCEDURE — 80053 COMPREHEN METABOLIC PANEL: CPT | Performed by: EMERGENCY MEDICINE

## 2021-08-18 RX ORDER — SODIUM CHLORIDE 0.9 % (FLUSH) 0.9 %
10 SYRINGE (ML) INJECTION AS NEEDED
Status: DISCONTINUED | OUTPATIENT
Start: 2021-08-18 | End: 2021-08-21 | Stop reason: HOSPADM

## 2021-08-18 RX ORDER — ASPIRIN 81 MG/1
324 TABLET, CHEWABLE ORAL ONCE
Status: COMPLETED | OUTPATIENT
Start: 2021-08-18 | End: 2021-08-18

## 2021-08-18 RX ADMIN — ASPIRIN 324 MG: 81 TABLET, CHEWABLE ORAL at 18:48

## 2021-08-18 RX ADMIN — SODIUM CHLORIDE 500 ML: 9 INJECTION, SOLUTION INTRAVENOUS at 22:27

## 2021-08-18 NOTE — ED PROVIDER NOTES
Time: 5:01 PM EDT  Arrived by: ambulance  Chief Complaint:   Chief Complaint   Patient presents with   • Atrial Fibrillation   • Headache     History provided by: patient  History is limited by: N/A     History of Present Illness:  Patient is a 85 y.o. year old male that presents to the emergency department with body aches that started this morning. Pt was told by home health nurse to come to ED for evaluation for an irregular heart rate. Pt missed dialysis today due to not feeling well. Pt was discharged from a rehab facility yesterday.     Pt is on peritoneal dialysis.   Pt was vaccinated for COVID-19.       Atrial Fibrillation  Severity:  Mild  Onset quality:  Unable to specify  Duration:  1 day  Timing:  Unable to specify  Progression:  Unable to specify  Chronicity:  New  Relieved by:  Nothing  Worsened by:  Nothing  Ineffective treatments:  None tried  Associated symptoms: no chest pain, no cough, no diarrhea, no fever, no rash, no shortness of breath and no vomiting    Weakness - Generalized  Severity:  Moderate  Onset quality:  Unable to specify  Duration:  1 day  Timing:  Constant  Progression:  Unchanged  Chronicity:  New  Relieved by:  None tried  Worsened by:  Nothing  Ineffective treatments:  None tried  Associated symptoms: no chest pain, no cough, no diarrhea, no dysuria, no fever, no frequency, no seizures, no shortness of breath and no vomiting        Similar Symptoms Previously: no  Recently seen: yes      Patient Care Team  Primary Care Provider: Dave Escobedo MD  Cardiologist: Amarjit    Past Medical History:     Allergies   Allergen Reactions   • Oxycodone Other (See Comments)     DOESN'T LIKE FEELING    • Renagel [Sevelamer] Unknown - Low Severity     Past Medical History:   Diagnosis Date   • Basal cell carcinoma     REMOVED FROM FACE   • CHF (congestive heart failure) (CMS/[facility])     LAST EPISODE 8/2020   • Condition not found     BILATERAL HEARING AIDS   • Condition not found     CARDIAC  ABLIATION 3/2018, [facility]O KNIGHT   • CPAP (continuous positive airway pressure) dependence    • Diabetes (CMS/HCC)     TYPE 2, BS IN THE -110   • Fistula     LEFT ARM, TUES&SAT   • Hyperlipidemia    • Hypertension     CONTROLLED WITH MEDS   • Hypothyroidism    • Myeloma (CMS/HCC)     MULTIPLE, REMISSION SINCE 2008   • Pacemaker     2018, WATCHMAN DEVICE AFTER PACEMAKER   • Paroxysmal A-fib (CMS/[facility])    • Renal disease     END STAGE   • Skin cancer of nose    • Troponin level elevated 7/26/2021   • Tuberculin skin 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     NEG   • Uses nebulizer and inhaler at home    • Wears glasses      Past Surgical History:   Procedure Laterality Date   • ARTERIOVENOUS FISTULA      LEFT ARM   • CARDIOVERSION      IN MAY 2018   • CATARACT EXTRACTION      BOTH EYES   • HEMORRHOIDECTOMY     • OTHER SURGICAL HISTORY      DENTAL IMPLANT   • OTHER SURGICAL HISTORY      ABLASION 3/2018   • PACEMAKER IMPLANTATION       No family history on file.    Home Medications:  Prior to Admission medications    Medication Sig Start Date End Date Taking? Authorizing Provider   albuterol sulfate HFA (Ventolin HFA) 108 (90 Base) MCG/ACT inhaler Ventolin HFA 90 mcg/actuation inhalation HFA aerosol inhaler inhale 1 puff (90 mcg) by inhalation route every 6 hours as needed   Suspended    Francheska Lazaro MD   amiodarone (PACERONE) 200 MG tablet Take 200 mg by mouth Daily. 6/7/21   Francheska Lazaro MD   calcitriol (ROCALTROL) 0.5 MCG capsule Take 0.5 mcg by mouth Daily.    Francheska Lazaro MD   calcium acetate (PHOS BINDER,) 667 MG capsule capsule Take 667 mg by mouth 3 (Three) Times a Day. 5/5/21   Francheska Lazaro MD   cetirizine (zyrTEC) 10 MG tablet Take 10 mg by mouth Daily.    Francheska Lzaaro MD   dilTIAZem CD (CARDIZEM CD) 120 MG 24 hr capsule Take 120 mg by mouth Daily. 6/21/21   Francheska Lazaro MD   docusate sodium (COLACE) 100 MG capsule Take 100 mg by mouth 2 (Two) Times a Day.    Iveth  MD Francheska   finasteride (PROSCAR) 5 MG tablet Take 5 mg by mouth every night at bedtime. 21   Francheska Lazaro MD   gabapentin (NEURONTIN) 100 MG capsule Take 100 mg by mouth 2 (Two) Times a Day. 21   Francheska Lazaro MD   levothyroxine (SYNTHROID, LEVOTHROID) 200 MCG tablet Take 1 tablet by mouth Daily for 60 days. 21  Dave Escobedo MD   metoprolol tartrate (LOPRESSOR) 25 MG tablet Take 12.5 mg by mouth 2 (Two) Times a Day. 21   Francheska Lazaro MD   midodrine (PROAMATINE) 5 MG tablet Take 10 mg by mouth 2 (Two) Times a Day With Meals.    Francheska Lazaro MD   montelukast (SINGULAIR) 10 MG tablet Take 10 mg by mouth Daily. 21   Francheska Lazaro MD   pantoprazole (PROTONIX) 40 MG EC tablet Take 40 mg by mouth Daily. 21   Francheska Lazaro MD   potassium chloride (MICRO-K) 10 MEQ CR capsule Take 10 mEq by mouth 2 (Two) Times a Day. 21   Francheska Lazaro MD   predniSONE (DELTASONE) 10 MG tablet Take 10 mg by mouth Every Other Day. Pt takes Monday, Wed, 21   Francheska Lazaro MD   Symbicort 160-4.5 MCG/ACT inhaler Inhale 2 puffs 2 (Two) Times a Day. 21   Francheska Lazaro MD   vitamin D3 125 MCG (5000 UT) capsule capsule Take 5,000 Units by mouth Daily.    Francheska Lazaro MD        Social History:   Social History     Tobacco Use   • Smoking status: Former Smoker     Quit date: 1980     Years since quittin.1   • Smokeless tobacco: Former User     Quit date: 1992   • Tobacco comment: smoked cigars on occasion   Vaping Use   • Vaping Use: Never used   Substance Use Topics   • Alcohol use: Not Currently   • Drug use: Never         Review of Systems:  Review of Systems   Constitutional: Negative for chills and fever.   HENT: Negative for nosebleeds.    Eyes: Negative for redness.   Respiratory: Negative for cough and shortness of breath.    Cardiovascular: Negative for chest pain.   Gastrointestinal:  "Negative for diarrhea and vomiting.   Genitourinary: Negative for dysuria and frequency.   Musculoskeletal: Negative for back pain and neck pain.   Skin: Negative for rash.   Neurological: Positive for weakness. Negative for seizures.        Physical Exam:  BP (!) 85/46 (BP Location: Right arm)   Pulse 99   Temp 98.2 °F (36.8 °C) (Oral)   Resp 20   Ht 188 cm (74\")   Wt 76.2 kg (167 lb 15.9 oz)   SpO2 92%   BMI 21.57 kg/m²     Physical Exam  Vitals and nursing note reviewed.   Constitutional:       General: He is awake. He is not in acute distress.     Appearance: Normal appearance. He is not toxic-appearing.   HENT:      Head: Normocephalic and atraumatic.      Nose: Nose normal.      Mouth/Throat:      Pharynx: Oropharynx is clear.   Eyes:      General: Lids are normal. No scleral icterus.     Conjunctiva/sclera: Conjunctivae normal.      Pupils: Pupils are equal, round, and reactive to light.   Cardiovascular:      Rate and Rhythm: Tachycardia present. Rhythm irregular.      Pulses:           Carotid pulses are 1+ on the right side and 1+ on the left side.       Radial pulses are 1+ on the right side and 1+ on the left side.        Femoral pulses are 1+ on the right side and 1+ on the left side.       Popliteal pulses are 1+ on the right side and 1+ on the left side.        Dorsalis pedis pulses are 1+ on the right side and 1+ on the left side.        Posterior tibial pulses are 1+ on the right side and 1+ on the left side.      Heart sounds: Normal heart sounds. No murmur heard.     Pulmonary:      Effort: Pulmonary effort is normal. No accessory muscle usage, respiratory distress or retractions.      Breath sounds: Normal breath sounds and air entry. No decreased breath sounds, wheezing, rhonchi or rales.      Comments: Lungs are clear bilaterally.   Chest:      Chest wall: No tenderness.      Comments: Pacemaker located in anterior superior chest wall.   Abdominal:      General: There is distension.      " Palpations: Abdomen is soft.      Tenderness: There is no abdominal tenderness. There is no guarding or rebound.      Comments: No rigidity. PD catheter located in right mid abdomen with surrounding erythema.    Musculoskeletal:         General: No tenderness. Normal range of motion.      Cervical back: Normal range of motion and neck supple. No tenderness.      Right lower leg: No edema.      Left lower leg: No edema.   Skin:     General: Skin is warm and dry.      Findings: Ecchymosis present. No rash.      Comments: Chronic ecchymosis on hands and forearms.    Neurological:      General: No focal deficit present.      Mental Status: He is alert and oriented to person, place, and time.      Sensory: Sensation is intact. No sensory deficit.      Motor: Weakness present.      Comments: Generalized weakness worse in the legs.    Psychiatric:         Mood and Affect: Mood normal.         Behavior: Behavior normal.                Medications in the Emergency Department:  Medications   sodium chloride 0.9 % flush 10 mL (has no administration in time range)   aspirin chewable tablet 324 mg (324 mg Oral Given 8/18/21 1848)   sodium chloride 0.9 % bolus 500 mL (500 mL Intravenous New Bag 8/18/21 2227)        Labs  Lab Results (last 24 hours)     Procedure Component Value Units Date/Time    POC Troponin I with Hold Tube [[id number]] Collected: 08/18/21 1647    Specimen: Blood Updated: 08/18/21 1701    Narrative:      The following orders were created for panel order POC Troponin I with Hold Tube.  Procedure                               Abnormality         Status                     ---------                               -----------         ------                     POC Troponin I[[id number]]                                                              HOLD Troponin-I Tube[[id number]]                             Final result                 Please view results for these tests on the individual orders.    CBC & Differential  [[id number]]  (Abnormal) Collected: 08/18/21 1647    Specimen: Blood Updated: 08/18/21 1654    Narrative:      The following orders were created for panel order CBC & Differential.  Procedure                               Abnormality         Status                     ---------                               -----------         ------                     CBC Auto Differential[[id number]]        Abnormal            Final result                 Please view results for these tests on the individual orders.    Comprehensive Metabolic Panel [[id number]]  (Abnormal) Collected: 08/18/21 1647    Specimen: Blood Updated: 08/18/21 1715     Glucose 89 mg/dL      BUN 31 mg/dL      Creatinine 6.20 mg/dL      Sodium 130 mmol/L      Potassium 4.5 mmol/L      Comment: Slight hemolysis detected by analyzer. Results may be affected.        Chloride 96 mmol/L      CO2 19.3 mmol/L      Calcium 9.6 mg/dL      Total Protein 6.1 g/dL      Albumin 2.20 g/dL      ALT (SGPT) 36 U/L      AST (SGOT) 78 U/L      Alkaline Phosphatase 285 U/L      Total Bilirubin 0.5 mg/dL      eGFR Non African Amer 9 mL/min/1.73      Comment: <15 Indicative of kidney failure.        eGFR   Amer --     Comment: <15 Indicative of kidney failure.        Globulin 3.9 gm/dL      A/G Ratio 0.6 g/dL      BUN/Creatinine Ratio 5.0     Anion Gap 14.7 mmol/L     Narrative:      GFR Normal >60  Chronic Kidney Disease <60  Kidney Failure <15      Lipase [[id number]]  (Normal) Collected: 08/18/21 1647    Specimen: Blood Updated: 08/18/21 1715     Lipase 53 U/L     BNP [[id number]]  (Abnormal) Collected: 08/18/21 1647    Specimen: Blood Updated: 08/18/21 1713     proBNP 18,127.0 pg/mL     Narrative:      Among patients with dyspnea, NT-proBNP is highly sensitive for the detection of acute congestive heart failure. In addition NT-proBNP of <300 pg/ml effectively rules out acute congestive heart failure with 99% negative predictive value.    Results may be falsely decreased if  patient taking Biotin.      Magnesium [[id number]]  (Abnormal) Collected: 08/18/21 1647    Specimen: Blood Updated: 08/18/21 1715     Magnesium 1.4 mg/dL     CK Total & CKMB [[id number]]  (Normal) Collected: 08/18/21 1647    Specimen: Blood Updated: 08/18/21 1715     CKMB 1.24 ng/mL      Creatine Kinase 22 U/L     Narrative:      CKMB results may be falsely decreased if patient taking Biotin.    CBC Auto Differential [[id number]]  (Abnormal) Collected: 08/18/21 1647    Specimen: Blood Updated: 08/18/21 1654     WBC 10.17 10*3/mm3      RBC 3.10 10*6/mm3      Hemoglobin 10.5 g/dL      Hematocrit 31.6 %      .9 fL      MCH 33.9 pg      MCHC 33.2 g/dL      RDW 14.7 %      RDW-SD 53.7 fl      MPV 9.0 fL      Platelets 237 10*3/mm3      Neutrophil % 72.4 %      Lymphocyte % 16.8 %      Monocyte % 7.3 %      Eosinophil % 1.9 %      Basophil % 0.5 %      Immature Grans % 1.1 %      Neutrophils, Absolute 7.37 10*3/mm3      Lymphocytes, Absolute 1.71 10*3/mm3      Monocytes, Absolute 0.74 10*3/mm3      Eosinophils, Absolute 0.19 10*3/mm3      Basophils, Absolute 0.05 10*3/mm3      Immature Grans, Absolute 0.11 10*3/mm3      nRBC 0.0 /100 WBC     T4, Free [[id number]]  (Normal) Collected: 08/18/21 1647    Specimen: Blood Updated: 08/18/21 1823     Free T4 1.42 ng/dL     Narrative:      Results may be falsely increased if patient taking Biotin.      TSH [[id number]]  (Abnormal) Collected: 08/18/21 1647    Specimen: Blood Updated: 08/18/21 1823     TSH 41.230 uIU/mL     POC Troponin I [[id number]]  (Normal) Collected: 08/18/21 1648    Specimen: Blood Updated: 08/18/21 1700     Troponin I 0.06 ng/mL      Comment: Serial Number: [id number]:  [id number]       COVID-19,  [facility] IN-HOUSE, NP SWAB IN TRANSPORT MEDIA 8-10 HR TAT - Swab, Nasopharynx [[id number]] Collected: 08/18/21 1758    Specimen: Swab from Nasopharynx Updated: 08/18/21 1808    Influenza Antigen, Rapid - Swab, Nasopharynx [[id number]]  (Normal) Collected: 08/18/21  1758    Specimen: Swab from Nasopharynx Updated: 08/18/21 1838     Influenza A Ag, EIA Negative     Influenza B Ag, EIA Negative    Body Fluid Cell Count With Differential - Body Fluid, Peritoneum [[id number]] Collected: 08/18/21 1802    Specimen: Body Fluid from Peritoneum Updated: 08/18/21 2035    Narrative:      The following orders were created for panel order Body Fluid Cell Count With Differential - Body Fluid, Peritoneum.  Procedure                               Abnormality         Status                     ---------                               -----------         ------                     Body fluid cell count - ...[[id number]]                      Final result               Body fluid differential ...[[id number]]                      Final result                 Please view results for these tests on the individual orders.    Body fluid cell count - Body Fluid, Peritoneum [[id number]] Collected: 08/18/21 1802    Specimen: Body Fluid from Peritoneum Updated: 08/18/21 2035     Color, Fluid --     Comment: Light yellow        Appearance, Fluid Clear     Nucleated Cells, Fluid 17 /mm3      RBC, Fluid <2,000 /mm3     Narrative:      No reference range established. Physician to interpret results with clinical findings.    Body fluid differential - Body Fluid, Peritoneum [[id number]] Collected: 08/18/21 1802    Specimen: Body Fluid from Peritoneum Updated: 08/18/21 2035     Neutrophils, Fluid 4 %      Lymphocytes, Fluid 20 %      Monocytes, Fluid 36 %      Eosinophils, Fluid 12 %      Macrophage, Fluid 28 %     POC Troponin I [[id number]]  (Normal) Collected: 08/18/21 1857    Specimen: Blood Updated: 08/18/21 1910     Troponin I 0.06 ng/mL      Comment: Serial Number: [id number]:  [id number]       POC Glucose Once [[id number]]  (Abnormal) Collected: 08/18/21 2221    Specimen: Blood Updated: 08/18/21 2223     Glucose 104 mg/dL      Comment: Serial Number: [id number]:  [id number]       Urinalysis With Culture If  Indicated - Urine, Clean Catch [[id number]]  (Abnormal) Collected: 08/18/21 2238    Specimen: Urine, Clean Catch Updated: 08/18/21 2300     Color, UA Yellow     Appearance, UA Clear     pH, UA 8.5     Specific Gravity, UA 1.015     Glucose, UA Negative     Ketones, UA Negative     Bilirubin, UA Negative     Blood, UA Negative     Protein,  mg/dL (2+)     Leuk Esterase, UA Small (1+)     Nitrite, UA Negative     Urobilinogen, UA 1.0 E.U./dL    Urinalysis, Microscopic Only - Urine, Clean Catch [[id number]]  (Abnormal) Collected: 08/18/21 2238    Specimen: Urine, Clean Catch Updated: 08/18/21 2300     RBC, UA 3-5 /HPF      WBC, UA 21-30 /HPF      Bacteria, UA None Seen /HPF      Squamous Epithelial Cells, UA 0-2 /HPF      Hyaline Casts, UA 0-2 /LPF      Methodology Automated Microscopy    Urine Culture - Urine, Urine, Clean Catch [[id number]] Collected: 08/18/21 2238    Specimen: Urine, Clean Catch Updated: 08/18/21 2300           Imaging:  XR Chest 1 View    Result Date: 8/18/2021  PROCEDURE: XR CHEST 1 VW  COMPARISON: [facility], , XR CHEST 1 VW, 7/26/2021, 15:50.  INDICATIONS: Chest Pain triage protocol  FINDINGS:  Cardiomegaly.  No dense consolidation, pleural fluid or pneumothorax.  Pacer leads are unchanged.  CONCLUSION: No acute change from 7/26/2021       UGO KHAN MD       Electronically Signed and Approved By: UGO KHAN MD on 8/18/2021 at 18:43               Procedures:  Procedures    Progress  ED Course as of Aug 19 0025   Wed Aug 18, 2021   1913 EKG:    Rhythm: Atrial fibrillation  Rate: 102  Intervals: Normal QT interval  T-wave: Nonspecific T wave flattening in V2, V3, V4, V5, V6, I, aVL, II, room #3, aVF  ST Segment: Nonspecific slight ST depression in V4, V5, V6, there is no pathological ST elevation or ST depression    EKG Comparison: Unavailable    Interpreted by me      [SD]   2009 CONCLUSION: No acute change from 7/26/2021   XR Chest 1 View [JW]      ED Course User  Index  [JW] Valorie Cordero  [SD] Hayder Zuniga DO                            Medical Decision Making:  MDM  Number of Diagnoses or Management Options  Atrial fibrillation with controlled ventricular response (CMS/HCC)  Chronic anemia  Chronic renal failure, unspecified CKD stage  Hypomagnesemia  Transient hypotension  Weakness  Diagnosis management comments:         At the time of admission, the patient is resting comfortably, in no distress and nontoxic.  Patient's blood pressure has been at the low end of normal.  The patient did respond to some IV fluids.  The patient has a history of atrial fibrillation.  The patient's in A. fib at this time but there is no notable ST changes.  The patient's heart rate is right around 100.  The patient's chest x-ray has chronic changes.  The patient's renal function appears to be at his baseline.  The patient has some hyponatremia and hypomagnesia but his potassium is normal.  The patient's PD fluid was sent for analysis.  The patient does not appear to have peritonitis.  The patient is anemic today but it again is at his baseline.  The exact source of the patient's weakness is unknown.  At this time it looks like that its due to chronic disease and deconditioning.  The patient discharged from rehab today.  However, when the patient got home he was too weak to even sit up.  The patient's weakness is generalized and not focal.  The patient in bed feels like he cannot go home because he is too weak.  The patient's wife is at bedside and does not feel comfortable taking the patient home in his current state.  Patient was tested for COVID-19 and the results are pending at the time of admission.  The patient will be admitted to the hospital at the request of the patient and his wife due to the patient's high risk for fall and unable to care for him at home at this time.  Discussed the case with his primary care physician who admit the patient to the hospital           Amount  and/or Complexity of Data Reviewed  Clinical lab tests: reviewed  Tests in the radiology section of CPT®: reviewed  Tests in the medicine section of CPT®: reviewed  Decide to obtain previous medical records or to obtain history from someone other than the patient: yes                 Final diagnoses:   Weakness   Chronic renal failure, unspecified CKD stage   Hypomagnesemia   Chronic anemia   Atrial fibrillation with controlled ventricular response (CMS/HCC)   Transient hypotension        Disposition:  ED Disposition     ED Disposition Condition Comment    Decision to Admit  Level of Care: Telemetry [5]   Diagnosis: Weakness [331166]   Admitting Physician: SHEBA VAZQUEZ [[id number]]   Attending Physician: SHEBA VAZQUEZ [[id number]]   Isolate for COVID?: Yes [1]   Certification: I Certify That Inpatient Hospital Services Are Medically Necessary For Greater Than 2 Midnights            This medical record created using voice recognition software and may contain unintended errors.    Documentation assistance provided by Valorie Cordero acting as scribe for Hayder Zuniga DO. Information recorded by the scribe was done at my direction and has been verified and validated by me.          Valorie Cordero  08/18/21 1717       Valorie Cordero  08/18/21 1718       Hayder Zuniga DO  08/19/21 [id number]

## 2021-08-19 PROBLEM — G90.9 AUTONOMIC NEUROPATHY: Status: ACTIVE | Noted: 2021-08-19

## 2021-08-19 PROBLEM — R53.1 WEAKNESS: Status: ACTIVE | Noted: 2021-08-19

## 2021-08-19 PROBLEM — Z99.2 ESRD (END STAGE RENAL DISEASE) ON DIALYSIS (HCC): Status: ACTIVE | Noted: 2020-06-10

## 2021-08-19 LAB
QT INTERVAL: 385 MS
SARS-COV-2 N GENE RESP QL NAA+PROBE: NOT DETECTED

## 2021-08-19 PROCEDURE — 25010000002 EPOETIN ALFA PER 1000 UNITS: Performed by: INTERNAL MEDICINE

## 2021-08-19 PROCEDURE — 25010000002 ENOXAPARIN PER 10 MG: Performed by: INTERNAL MEDICINE

## 2021-08-19 RX ORDER — FAMOTIDINE 20 MG/1
40 TABLET, FILM COATED ORAL DAILY
Status: DISCONTINUED | OUTPATIENT
Start: 2021-08-19 | End: 2021-08-21 | Stop reason: HOSPADM

## 2021-08-19 RX ORDER — BISACODYL 5 MG/1
5 TABLET, DELAYED RELEASE ORAL DAILY PRN
Status: DISCONTINUED | OUTPATIENT
Start: 2021-08-19 | End: 2021-08-21 | Stop reason: HOSPADM

## 2021-08-19 RX ORDER — SODIUM CHLORIDE 9 MG/ML
75 INJECTION, SOLUTION INTRAVENOUS CONTINUOUS
Status: DISCONTINUED | OUTPATIENT
Start: 2021-08-19 | End: 2021-08-21 | Stop reason: HOSPADM

## 2021-08-19 RX ORDER — AMOXICILLIN 250 MG
2 CAPSULE ORAL 2 TIMES DAILY
Status: DISCONTINUED | OUTPATIENT
Start: 2021-08-19 | End: 2021-08-21 | Stop reason: HOSPADM

## 2021-08-19 RX ORDER — HYDROCODONE BITARTRATE AND ACETAMINOPHEN 10; 325 MG/1; MG/1
1 TABLET ORAL EVERY 4 HOURS PRN
Status: DISCONTINUED | OUTPATIENT
Start: 2021-08-19 | End: 2021-08-21 | Stop reason: HOSPADM

## 2021-08-19 RX ORDER — NALOXONE HCL 0.4 MG/ML
0.4 VIAL (ML) INJECTION
Status: DISCONTINUED | OUTPATIENT
Start: 2021-08-19 | End: 2021-08-21 | Stop reason: HOSPADM

## 2021-08-19 RX ORDER — ACETAMINOPHEN 160 MG/5ML
650 SOLUTION ORAL EVERY 4 HOURS PRN
Status: DISCONTINUED | OUTPATIENT
Start: 2021-08-19 | End: 2021-08-21 | Stop reason: HOSPADM

## 2021-08-19 RX ORDER — HYDROCODONE BITARTRATE AND ACETAMINOPHEN 5; 325 MG/1; MG/1
1 TABLET ORAL EVERY 4 HOURS PRN
Status: DISCONTINUED | OUTPATIENT
Start: 2021-08-19 | End: 2021-08-21 | Stop reason: HOSPADM

## 2021-08-19 RX ORDER — ONDANSETRON 2 MG/ML
4 INJECTION INTRAMUSCULAR; INTRAVENOUS EVERY 6 HOURS PRN
Status: DISCONTINUED | OUTPATIENT
Start: 2021-08-19 | End: 2021-08-21 | Stop reason: HOSPADM

## 2021-08-19 RX ORDER — CHOLECALCIFEROL (VITAMIN D3) 125 MCG
5 CAPSULE ORAL NIGHTLY PRN
Status: DISCONTINUED | OUTPATIENT
Start: 2021-08-19 | End: 2021-08-21 | Stop reason: HOSPADM

## 2021-08-19 RX ORDER — ACETAMINOPHEN 325 MG/1
650 TABLET ORAL EVERY 4 HOURS PRN
Status: DISCONTINUED | OUTPATIENT
Start: 2021-08-19 | End: 2021-08-21 | Stop reason: HOSPADM

## 2021-08-19 RX ORDER — POLYETHYLENE GLYCOL 3350 17 G/17G
17 POWDER, FOR SOLUTION ORAL DAILY PRN
Status: DISCONTINUED | OUTPATIENT
Start: 2021-08-19 | End: 2021-08-21 | Stop reason: HOSPADM

## 2021-08-19 RX ORDER — ACETAMINOPHEN 650 MG/1
650 SUPPOSITORY RECTAL EVERY 4 HOURS PRN
Status: DISCONTINUED | OUTPATIENT
Start: 2021-08-19 | End: 2021-08-21 | Stop reason: HOSPADM

## 2021-08-19 RX ORDER — BISACODYL 10 MG
10 SUPPOSITORY, RECTAL RECTAL DAILY PRN
Status: DISCONTINUED | OUTPATIENT
Start: 2021-08-19 | End: 2021-08-21 | Stop reason: HOSPADM

## 2021-08-19 RX ORDER — ALUMINA, MAGNESIA, AND SIMETHICONE 2400; 2400; 240 MG/30ML; MG/30ML; MG/30ML
15 SUSPENSION ORAL EVERY 6 HOURS PRN
Status: DISCONTINUED | OUTPATIENT
Start: 2021-08-19 | End: 2021-08-21 | Stop reason: HOSPADM

## 2021-08-19 RX ORDER — ALPRAZOLAM 0.25 MG/1
0.5 TABLET ORAL EVERY 8 HOURS PRN
Status: DISCONTINUED | OUTPATIENT
Start: 2021-08-19 | End: 2021-08-21 | Stop reason: HOSPADM

## 2021-08-19 RX ADMIN — FAMOTIDINE 40 MG: 20 TABLET ORAL at 14:12

## 2021-08-19 RX ADMIN — ENOXAPARIN SODIUM 30 MG: 100 INJECTION SUBCUTANEOUS at 14:11

## 2021-08-19 RX ADMIN — ERYTHROPOIETIN 20000 UNITS: 20000 INJECTION, SOLUTION INTRAVENOUS; SUBCUTANEOUS at 14:12

## 2021-08-19 RX ADMIN — HYDROCODONE BITARTRATE AND ACETAMINOPHEN 1 TABLET: 5; 325 TABLET ORAL at 15:25

## 2021-08-19 RX ADMIN — SODIUM CHLORIDE 100 ML/HR: 9 INJECTION, SOLUTION INTRAVENOUS at 14:12

## 2021-08-19 NOTE — PLAN OF CARE
Problem: Adult Inpatient Plan of Care  Goal: Plan of Care Review  Outcome: Ongoing, Progressing  Flowsheets  Taken 8/19/2021 1827 by Laxmi Fisher RNA  Progress: improving  Outcome Summary: Covid test came back negative. VSS. Received peritoneal dialysis. Will get dialysis again tonight. Will continue to monitor.  Taken 8/19/2021 0410 by Abida Mckeon RN  Plan of Care Reviewed With: patient   Goal Outcome Evaluation:           Progress: improving  Outcome Summary: Covid test came back negative. VSS. Received peritoneal dialysis. Will get dialysis again tonight. Will continue to monitor.

## 2021-08-19 NOTE — CASE MANAGEMENT/SOCIAL WORK
Discharge Planning Assessment   Camelia     Patient Name: Cristhian Watts  MRN: [id number]  Today's Date: 8/19/2021    Admit Date: 8/18/2021    Discharge Needs Assessment     Row Name 08/19/21 1221       Living Environment    Lives With  spouse    Current Living Arrangements  home/apartment/condo    Primary Care Provided by  spouse/significant other    Provides Primary Care For  no one, unable/limited ability to care for self    Quality of Family Relationships  helpful;involved;supportive    Able to Return to Prior Arrangements  other (see comments) unsure at this time. Pt may need rehab prior to home       Resource/Environmental Concerns    Resource/Environmental Concerns  none       Transition Planning    Patient/Family Anticipates Transition to  inpatient rehabilitation facility;home with help/services    Patient/Family Anticipated Services at Transition  rehabilitation services;home health care    Transportation Anticipated  family or friend will provide       Discharge Needs Assessment    Readmission Within the Last 30 Days  previous discharge plan unsuccessful    Current Outpatient/Agency/Support Group  [facility]    Equipment Currently Used at Home  cpap;wheelchair;walker, rolling;shower chair;grab bar;cane, straight;commode    Concerns to be Addressed  discharge planning    Concerns Comments  may need rehab at [place]    Anticipated Changes Related to Illness  inability to care for self    Outpatient/Agency/Support Group Needs  inpatient rehabilitation facility    Discharge Facility/Level of Care Needs  rehabilitation facility    Current Discharge Risk  dependent with mobility/activities of daily living        Discharge Plan     Row Name 08/19/21 1226       Plan    Plan  Pt is readmit. Pt dc 8/1.Pt states he went to rehab. Pt states when he went home he became weaker. Pt states he cannot get out of bed.  Pt is unsure if he wants to go to rehab or home with [facility].    Patient/Family in  Agreement with Plan  yes        Continued Care and Services - Admitted Since 8/18/2021    Coordination has not been started for this encounter.         Demographic Summary     Row Name 08/19/21 1214       General Information    Admission Type  inpatient    Arrived From  emergency department    Reason for Consult  discharge planning    Preferred Language  English     Used During This Interaction  no        Functional Status     Row Name 08/19/21 1219       Functional Status    Usual Activity Tolerance  moderate    Current Activity Tolerance  poor       Functional Status, IADL    Medications  completely dependent    Meal Preparation  completely dependent    Housekeeping  completely dependent    Laundry  completely dependent    Shopping  completely dependent       Mental Status Summary    Recent Changes in Mental Status/Cognitive Functioning  no changes        Psychosocial    No documentation.       Abuse/Neglect    No documentation.       Legal    No documentation.       Substance Abuse    No documentation.       Patient Forms    No documentation.           Leanne Mackey RN

## 2021-08-19 NOTE — PLAN OF CARE
Goal Outcome Evaluation:  Plan of Care Reviewed With: patient        Progress: no change  Outcome Summary: admitted for weakness, covid test pending, VSS, will continue to monitor

## 2021-08-19 NOTE — H&P
[facility]   HISTORY AND PHYSICAL    Patient Name: Cristhian Watts  : 1935  MRN: [id number]  Primary Care Physician:  Dave Escobedo MD  Date of admission: 2021    Subjective   Subjective     Chief Complaint: Weakness    HPI:    Cristhian Watts is a 85 y.o. male  Mr. Watts with past medical history significant for multiple myeloma, diabetes mellitus, ESRD on peritoneal dialysis was recently admitted to the hospital and then was sent to rehab where he spent about 2 weeks and then patient went home.  Apparently his blood pressure was running very low and he could not get out of the bed he could not stand.  He was so weak that he could not do anything and because of that reason patient was brought to the emergency room from where he is being admitted.  When I see the patient he is awake alert he denies fever chills headaches chest pain diarrhea but his appetite is poor and he said he cannot even stand    Review of Systems  Constitutional:       Weakness tiredness fatigue  Eyes:                      No blurry vision, eye discharge, eye irritation, eye pain  HEENT:                  No acute hair loss, earache and discharge, nasal congestion or discharge, sore throat, postnasal drip  Respiratory:          No shortness of breath coughing sputum production wheezing hemoptysis pleuritic chest pain  Cardiovascular:    No chest pain, orthopnea, PND, dizziness, palpitation, lower extremity edema  Gastrointestinal:  No nausea vomiting diarrhea abdominal pain constipation  Genitourinary:      No urinary incontinence, hesitancy, frequency, urgency, dysuria  Neurological:       No confusion, headache, focal weakness, numbness, dysphasia  Hematologic:        No bruising, bleeding, pallor, lymphadenopathy  Endocrine:           No coldness, hot flashes, polyuria, abnormal hair growth  Musculoskeletal:   No body pains, aches, arthritic pains, muscle pain ,muscle wasting  Psychiatric:         No low or  high mood, anxiety, hallucinations, delusions  Skin.                     No rash, ulcers, bruising, itching    Personal History     Past Medical History:   Diagnosis Date   • Basal cell carcinoma     REMOVED FROM FACE   • CHF (congestive heart failure) (CMS/[facility])     LAST EPISODE 8/2020   • Condition not found     BILATERAL HEARING AIDS   • Condition not found     CARDIAC ABLIATION 3/2018, FOLLOWS TYRA KNIGHT   • CPAP (continuous positive airway pressure) dependence    • Diabetes (CMS/[facility])     TYPE 2, BS IN THE -110   • Fistula     LEFT ARM, TUES&SAT   • Hyperlipidemia    • Hypertension     CONTROLLED WITH MEDS   • Hypothyroidism    • Myeloma (CMS/[facility])     MULTIPLE, REMISSION SINCE 2008   • Pacemaker     2018, WATCHMAN DEVICE AFTER PACEMAKER   • Paroxysmal A-fib (CMS/[facility])    • Renal disease     END STAGE   • Skin cancer of nose    • Troponin level elevated 7/26/2021   • Tuberculin skin 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     NEG   • Uses nebulizer and inhaler at home    • Wears glasses        Past Surgical History:   Procedure Laterality Date   • ARTERIOVENOUS FISTULA      LEFT ARM   • CARDIOVERSION      IN MAY 2018   • CATARACT EXTRACTION      BOTH EYES   • HEMORRHOIDECTOMY     • OTHER SURGICAL HISTORY      DENTAL IMPLANT   • OTHER SURGICAL HISTORY      ABLASION 3/2018   • PACEMAKER IMPLANTATION         Family History: family history is not on file. Otherwise pertinent FHx was reviewed and not pertinent to current issue.    Social History:  reports that he quit smoking about 41 years ago. He quit smokeless tobacco use about 29 years ago. He reports previous alcohol use. He reports that he does not use drugs.    Home Medications:  albuterol sulfate HFA, amiodarone, budesonide-formoterol, calcitriol, calcium acetate, cetirizine, dilTIAZem CD, docusate sodium, finasteride, gabapentin, levothyroxine, metoprolol tartrate, midodrine, montelukast, pantoprazole, potassium chloride, predniSONE, and vitamin D3      Allergies:  Allergies    Allergen Reactions   • Oxycodone Other (See Comments)     DOESN'T LIKE FEELING    • Renagel [Sevelamer] Unknown - Low Severity       Objective   Objective     Vitals:   Temp:  [97.4 °F (36.3 °C)-98.2 °F (36.8 °C)] 98.2 °F (36.8 °C)  Heart Rate:  [] 119  Resp:  [18-20] 18  BP: ()/(36-89) 108/45  Physical Exam               Constitutional:        Awake, alert responsive, conversant, no obvious distress   Eyes:                      PERRLA, sclerae anicteric, no conjunctival injection   HEENT:                  Moist mucous membranes, no nasal or eye discharge, no throat congestion   Neck:                     Supple, no thyromegaly, no lymphadenopathy, trachea midline, no elevated JVD   Respiratory:          Clear to auscultation bilaterally, nonlabored respirations    Cardiovascular:    RRR, no murmurs, rubs, or gallops, palpable pedal pulses bilaterally,No bilateral ankle edema   Gastrointestinal:  Positive bowel sounds, soft, nontender, non distended, no organomegaly   Musculoskeletal: , no clubbing or cyanosis to extremities,muscle wasting, joint swelling, muscle weakness   Psychiatric:             Appropriate affect, cooperative   Neurologic:           Awake alert ,oriented x 3, strength symmetric in all extremities, Cranial Nerves grossly intact to confrontation, speech clear   Skin:                     No rashes , bruising's, skin ulcers, petechiae or ecchymosis    Result Review    Result Review:  I have personally reviewed the results from the time of this admission to 8/19/2021 12:46 EDT and agree with these findings:  []  Laboratory  []  Microbiology  []  Radiology  []  EKG/Telemetry   []  Cardiology/Vascular   []  Pathology  []  Old records  []  Other:    Assessment/Plan   Assessment / Plan     Active Hospital Problems:  Active Hospital Problems    Diagnosis    • Weakness    • Autonomic neuropathy    • ESRD (end stage renal disease) on dialysis (CMS/[facility])    • Prostate cancer metastatic to  multiple sites (CMS/[facility])    • Type 2 diabetes mellitus (CMS/[facility])      Formatting of this note might be different from the original.  Converted unresolved ICD9, potential mismatch.     • Paroxysmal atrial fibrillation (CMS/[facility])        Plan:   Give IV fluids  Peritoneal dialysis 1.5% exchanges  Check orthostatics    DVT prophylaxis:  No DVT prophylaxis order currently exists.    CODE STATUS:       Admission Status:  I believe this patient meets inpt status.    Electronically signed by Dave Escboedo MD, 08/19/21, 12:43 PM EDT.

## 2021-08-20 LAB
BACTERIA SPEC AEROBE CULT: NORMAL
QT INTERVAL: 379 MS

## 2021-08-20 PROCEDURE — 97110 THERAPEUTIC EXERCISES: CPT

## 2021-08-20 PROCEDURE — 97161 PT EVAL LOW COMPLEX 20 MIN: CPT

## 2021-08-20 PROCEDURE — 97165 OT EVAL LOW COMPLEX 30 MIN: CPT

## 2021-08-20 PROCEDURE — 25010000002 ENOXAPARIN PER 10 MG: Performed by: INTERNAL MEDICINE

## 2021-08-20 PROCEDURE — 97530 THERAPEUTIC ACTIVITIES: CPT

## 2021-08-20 RX ORDER — CALCITRIOL 0.25 UG/1
0.5 CAPSULE, LIQUID FILLED ORAL DAILY
Status: DISCONTINUED | OUTPATIENT
Start: 2021-08-21 | End: 2021-08-21 | Stop reason: HOSPADM

## 2021-08-20 RX ORDER — GABAPENTIN 100 MG/1
100 CAPSULE ORAL 2 TIMES DAILY
Status: DISCONTINUED | OUTPATIENT
Start: 2021-08-20 | End: 2021-08-21 | Stop reason: HOSPADM

## 2021-08-20 RX ORDER — CALCIUM ACETATE 667 MG/1
1334 CAPSULE ORAL
Status: DISCONTINUED | OUTPATIENT
Start: 2021-08-21 | End: 2021-08-21 | Stop reason: HOSPADM

## 2021-08-20 RX ORDER — LEVOTHYROXINE SODIUM 0.2 MG/1
200 TABLET ORAL
Status: DISCONTINUED | OUTPATIENT
Start: 2021-08-21 | End: 2021-08-21 | Stop reason: HOSPADM

## 2021-08-20 RX ORDER — AMIODARONE HYDROCHLORIDE 200 MG/1
200 TABLET ORAL
Status: DISCONTINUED | OUTPATIENT
Start: 2021-08-20 | End: 2021-08-21 | Stop reason: HOSPADM

## 2021-08-20 RX ORDER — FINASTERIDE 5 MG/1
5 TABLET, FILM COATED ORAL NIGHTLY
Status: DISCONTINUED | OUTPATIENT
Start: 2021-08-20 | End: 2021-08-21 | Stop reason: HOSPADM

## 2021-08-20 RX ORDER — MIDODRINE HYDROCHLORIDE 10 MG/1
10 TABLET ORAL
Status: DISCONTINUED | OUTPATIENT
Start: 2021-08-21 | End: 2021-08-21 | Stop reason: HOSPADM

## 2021-08-20 RX ORDER — MONTELUKAST SODIUM 10 MG/1
10 TABLET ORAL DAILY
Status: DISCONTINUED | OUTPATIENT
Start: 2021-08-21 | End: 2021-08-21 | Stop reason: HOSPADM

## 2021-08-20 RX ORDER — DILTIAZEM HYDROCHLORIDE 120 MG/1
120 CAPSULE, COATED, EXTENDED RELEASE ORAL
Status: DISCONTINUED | OUTPATIENT
Start: 2021-08-20 | End: 2021-08-21 | Stop reason: HOSPADM

## 2021-08-20 RX ADMIN — METOPROLOL TARTRATE 12.5 MG: 25 TABLET, FILM COATED ORAL at 22:11

## 2021-08-20 RX ADMIN — SODIUM CHLORIDE 75 ML/HR: 9 INJECTION, SOLUTION INTRAVENOUS at 22:12

## 2021-08-20 RX ADMIN — FINASTERIDE 5 MG: 5 TABLET, FILM COATED ORAL at 22:11

## 2021-08-20 RX ADMIN — HYDROCODONE BITARTRATE AND ACETAMINOPHEN 1 TABLET: 5; 325 TABLET ORAL at 06:08

## 2021-08-20 RX ADMIN — HYDROCODONE BITARTRATE AND ACETAMINOPHEN 1 TABLET: 10; 325 TABLET ORAL at 22:12

## 2021-08-20 RX ADMIN — GABAPENTIN 100 MG: 100 CAPSULE ORAL at 22:12

## 2021-08-20 RX ADMIN — DILTIAZEM HYDROCHLORIDE 120 MG: 120 CAPSULE, COATED, EXTENDED RELEASE ORAL at 22:11

## 2021-08-20 RX ADMIN — ENOXAPARIN SODIUM 30 MG: 100 INJECTION SUBCUTANEOUS at 14:39

## 2021-08-20 RX ADMIN — SODIUM CHLORIDE 100 ML/HR: 9 INJECTION, SOLUTION INTRAVENOUS at 00:07

## 2021-08-20 RX ADMIN — SODIUM CHLORIDE 100 ML/HR: 9 INJECTION, SOLUTION INTRAVENOUS at 10:06

## 2021-08-20 RX ADMIN — DOCUSATE SODIUM 50MG AND SENNOSIDES 8.6MG 2 TABLET: 8.6; 5 TABLET, FILM COATED ORAL at 09:07

## 2021-08-20 RX ADMIN — AMIODARONE HYDROCHLORIDE 200 MG: 200 TABLET ORAL at 22:12

## 2021-08-20 RX ADMIN — FAMOTIDINE 40 MG: 20 TABLET ORAL at 09:07

## 2021-08-20 RX ADMIN — SODIUM CHLORIDE, PRESERVATIVE FREE 10 ML: 5 INJECTION INTRAVENOUS at 22:13

## 2021-08-20 NOTE — THERAPY EVALUATION
Acute Care - Physical Therapy Initial Evaluation   Camelia     Patient Name: Cristhian Watts  : 1935  MRN: [id number]  Today's Date: 2021      Visit Dx:     ICD-10-CM ICD-9-CM   1. Weakness  R53.1 780.79   2. Chronic renal failure, unspecified CKD stage  N18.9 585.9   3. Hypomagnesemia  E83.42 275.2   4. Chronic anemia  D64.9 285.9   5. Atrial fibrillation with controlled ventricular response (CMS/[facility])  I48.91 427.31   6. Transient hypotension  I95.9 796.3   7. Decreased activities of daily living (ADL)  Z78.9 V49.89   8. 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   • 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 (CMS/[facility])   • Chronic obstructive pulmonary disease with acute lower respiratory infection (CMS/[facility])   • Congestive heart failure (CMS/[facility])   • Elevated prostate specific antigen (PSA)   • Encounter for immunization   • Essential hypertension   • Ex-smoker   • Gastroesophageal reflux disease   • Gout   • Hereditary and idiopathic neuropathy, unspecified   • Hypertrophy of prostate without urinary obstruction and other lower urinary tract symptoms (LUTS)   • Type 2 diabetes mellitus (CMS/[facility])   • Kidney failure   • Mild intermittent asthma   • Mixed hyperlipidemia   • Multiple myeloma in remission (CMS/[facility])   • 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   • Obesity   • Obstructive sleep apnea   • Osteoporosis   • Overweight with body mass index (BMI) 25.0-29.9   • Pleural effusion due to bacterial infection   • Postnasal drip   • Proteinuria   • Shortness of breath   • Thyrotoxicosis   • Vitamin D deficiency   • Troponin level elevated   • Generalized weakness   • Presence of cardiac pacemaker   • Other specified hypothyroidism   • Weakness    • Autonomic neuropathy     Past Medical History:   Diagnosis Date   • Basal cell carcinoma     REMOVED FROM FACE   • CHF (congestive heart failure) (CMS/[facility])     LAST EPISODE 8/2020   • Condition not found     BILATERAL HEARING AIDS   • Condition not found     CARDIAC ABLIATION 3/2018, FOLLOWS TYRA KNIGHT   • CPAP (continuous positive airway pressure) dependence    • Diabetes (CMS/[facility])     TYPE 2, BS IN THE -110   • Fistula     LEFT ARM, TUES&SAT   • Hyperlipidemia    • Hypertension     CONTROLLED WITH MEDS   • Hypothyroidism    • Myeloma (CMS/[facility])     MULTIPLE, REMISSION SINCE 2008   • Pacemaker     2018, WATCHMAN DEVICE AFTER PACEMAKER   • Paroxysmal A-fib (CMS/[facility])    • Renal disease     END STAGE   • Skin cancer of nose    • Troponin level elevated 7/26/2021   • Tuberculin skin 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     NEG   • Uses nebulizer and inhaler at home    • Wears glasses      Past Surgical History:   Procedure Laterality Date   • ARTERIOVENOUS FISTULA      LEFT ARM   • CARDIOVERSION      IN MAY 2018   • CATARACT EXTRACTION      BOTH EYES   • HEMORRHOIDECTOMY     • OTHER SURGICAL HISTORY      DENTAL IMPLANT   • OTHER SURGICAL HISTORY      ABLASION 3/2018   • PACEMAKER IMPLANTATION          PT Assessment (last 12 hours)      PT Evaluation and Treatment     Row Name 08/20/21 1041          Physical Therapy Time and Intention    Document Type  evaluation  -AV     Mode of Treatment  individual therapy;physical therapy  -AV     Symptoms Noted During/After Treatment  nausea  -AV     Row Name 08/20/21 1041          General Information    Patient Profile Reviewed  yes  -AV     Prior Level of Function  -- Reports assistance from wife for ADLs  -AV     Equipment Currently Used at Home  walker, rolling;wheelchair  -AV     Existing Precautions/Restrictions  fall  -AV     Row Name 08/20/21 1041          Living Environment    Current Living Arrangements  home/apartment/condo  -AV     Home Accessibility  -- States ramp has been built   "-AV     Lives With  spouse  -AV     Row Name 08/20/21 1041          Range of Motion (ROM)    Range of Motion  bilateral lower extremities;ROM is [facility]  -     Row Name 08/20/21 1041          Strength (Manual Muscle Testing)    Strength (Manual Muscle Testing)  bilateral lower extremities;strength is [facility]  -AV     Row Name 08/20/21 1041          Bed Mobility    Comment (Bed Mobility)  Patient completed supine-sit transfer x3 wtih minimal assist. Patient reports extreme nausea and dizziness with each supine-sit transfer, which resolves after ~1 minute once returned to supine. At end of session, once returned to supine in bed, patient reports everything \"looks wavy.\" Nurse notified of patient symptoms.   -AV     Row Name             Wound 08/19/21 1330 Left posterior elbow Skin Tear    Wound - Properties Group Placement Date: 08/19/21  -AW Placement Time: 1330  -AW Present on Hospital Admission: N  -AW Side: Left  -AW Orientation: posterior  -AW Location: elbow  -AW Primary Wound Type: Skin tear  -AW    Retired Wound - Properties Group Date first assessed: 08/19/21  -AW Time first assessed: 1330  -AW Present on Hospital Admission: N  -AW Side: Left  -AW Location: elbow  -AW Primary Wound Type: Skin tear  -AW    Row Name 08/20/21 1041          Plan of Care Review    Plan of Care Reviewed With  patient  -AV     Progress  no change  -AV     Outcome Summary  Patient presents with limitations impacting balance, transfers, and ambulation. Patient will benefit from skilled PT services to address these mobility deficits and decrease risk of falls.  -AV     Row Name 08/20/21 1041          Physical Therapy Goals    Bed Mobility Goal Selection (PT)  bed mobility, PT goal 1  -AV     Transfer Goal Selection (PT)  transfer, PT goal 1  -AV     Gait Training Goal Selection (PT)  gait training, PT goal 1  -AV     Row Name 08/20/21 1041          Bed Mobility Goal 1 (PT)    Activity/Assistive Device (Bed Mobility Goal 1, PT)  bed mobility " activities, all  -AV     [place] Level/Cues Needed (Bed Mobility Goal 1, PT)  standby assist  -AV     Time Frame (Bed Mobility Goal 1, PT)  long term goal (LTG);10 days  -AV     Row Name 08/20/21 1041          Transfer Goal 1 (PT)    Activity/Assistive Device (Transfer Goal 1, PT)  transfers, all  -AV     [place] Level/Cues Needed (Transfer Goal 1, PT)  minimum assist (75% or more patient effort)  -AV     Time Frame (Transfer Goal 1, PT)  long term goal (LTG);10 days  -AV     Row Name 08/20/21 1041          Gait Training Goal 1 (PT)    Activity/Assistive Device (Gait Training Goal 1, PT)  walker, rolling  -AV     [place] Level (Gait Training Goal 1, PT)  contact guard assist  -AV     Distance (Gait Training Goal 1, PT)  20  -AV     Time Frame (Gait Training Goal 1, PT)  long term goal (LTG);10 days  -AV     Row Name 08/20/21 1041          Therapy Assessment/Plan (PT)    Rehab Potential (PT)  good, to achieve stated therapy goals  -AV     Criteria for Skilled Interventions Met (PT)  yes;skilled treatment is necessary  -AV     Problem List (PT)  problems related to;balance;mobility  -AV     Activity Limitations Related to Problem List (PT)  unable to transfer safely;unable to ambulate safely  -AV     Row Name 08/20/21 1041          PT Evaluation Complexity    History, PT Evaluation Complexity  1-2 personal factors and/or comorbidities  -AV     Examination of Body Systems (PT Eval Complexity)  total of 3 or more elements  -AV     Clinical Presentation (PT Evaluation Complexity)  stable  -AV     Clinical Decision Making (PT Evaluation Complexity)  low complexity  -AV     Overall Complexity (PT Evaluation Complexity)  low complexity  -AV     Row Name 08/20/21 1041          Therapy Plan Review/Discharge Plan (PT)    Therapy Plan Review (PT)  evaluation/treatment results reviewed;patient  -AV       User Key  (r) = Recorded By, (t) = Taken By, (c) = Cosigned By    Initials Name Provider Type    TORIBIO Fisher  YURY Hair Registered Nurse    AV Abran Dooley, PT Physical Therapist        Physical Therapy Education                 Title: PT OT SLP Therapies (In Progress)     Topic: Physical Therapy (Done)     Point: Mobility training (Done)     Learning Progress Summary           Patient Acceptance, E,TB, VU by AV at 8/20/2021 1055                   Point: Home exercise program (Done)     Learning Progress Summary           Patient Acceptance, E,TB, VU by AV at 8/20/2021 1055                   Point: Body mechanics (Done)     Learning Progress Summary           Patient Acceptance, E,TB, VU by AV at 8/20/2021 1055                   Point: Precautions (Done)     Learning Progress Summary           Patient Acceptance, E,TB, VU by AV at 8/20/2021 1055                               User Key     Initials Effective Dates Name Provider Type Discipline     06/11/21 -  Abran Dooley, DONNY Physical Therapist PT              PT Recommendation and Plan  Anticipated Discharge Disposition (PT): sub acute care setting, inpatient rehabilitation facility  Planned Therapy Interventions (PT): balance training, bed mobility training, gait training, neuromuscular re-education, strengthening, transfer training  Therapy Frequency (PT): daily  Plan of Care Reviewed With: patient  Progress: no change  Outcome Summary: Patient presents with limitations impacting balance, transfers, and ambulation. Patient will benefit from skilled PT services to address these mobility deficits and decrease risk of falls.  Outcome Measures     Row Name 08/20/21 1053             How much help from another person do you currently need...    Turning from your back to your side while in flat bed without using bedrails?  3  -AV      Moving from lying on back to sitting on the side of a flat bed without bedrails?  3  -AV      Moving to and from a bed to a chair (including a wheelchair)?  3  -AV      Standing up from a chair using your arms (e.g., wheelchair,  bedside chair)?  3  -AV      Climbing 3-5 steps with a railing?  2  -AV      To walk in hospital room?  2  -AV      AM-PAC 6 Clicks Score (PT)  16  -AV         Functional Assessment    Outcome Measure Options  AM-PAC 6 Clicks Basic Mobility (PT)  -AV        User Key  (r) = Recorded By, (t) = Taken By, (c) = Cosigned By    Initials Name Provider Type    AV Abran Dooley, PT Physical Therapist           Time Calculation:   PT Charges     Row Name 08/20/21 1054             Time Calculation    PT Received On  08/20/21  -AV      PT Goal Re-Cert Due Date  08/29/21  -AV         Timed Charges    42218 - PT Therapeutic Activity Minutes  10  -AV         Untimed Charges    PT Eval/Re-eval Minutes  30  -AV         Total Minutes    Timed Charges Total Minutes  10  -AV      Untimed Charges Total Minutes  30  -AV       Total Minutes  40  -AV        User Key  (r) = Recorded By, (t) = Taken By, (c) = Cosigned By    Initials Name Provider Type    AV Abran Dooley, PT Physical Therapist        Therapy Charges for Today     Code Description Service Date Service Provider Modifiers Qty    79234604599 HC PT THERAPEUTIC ACT EA 15 MIN 8/20/2021 Abran Dooley, PT GP 1    94108937313 HC PT EVAL LOW COMPLEXITY 2 8/20/2021 Abran Dooley, PT GP 1          PT G-Codes  Outcome Measure Options: AM-PAC 6 Clicks Basic Mobility (PT)  AM-PAC 6 Clicks Score (PT): 16  AM-PAC 6 Clicks Score (OT): 7    Abran Dooley PT  8/20/2021

## 2021-08-20 NOTE — PLAN OF CARE
Goal Outcome Evaluation:           Progress: improving  Outcome Summary: Patient did get to the chair today.  He did eat all meals on the side of the bed.  Family visited and patient has been in good spirits all day.    PD was done twice this shift and patient tolerates well.

## 2021-08-20 NOTE — PLAN OF CARE
Goal Outcome Evaluation:  Plan of Care Reviewed With: patient        Progress: improving  Outcome Summary: no acute events overnight, peritoneal dialysis continued, VSS, will continue to monitor

## 2021-08-20 NOTE — SIGNIFICANT NOTE
08/20/21 1125   Coping/Psychosocial   Observed Emotional State calm;pleasant   Verbalized Emotional State happiness   Trust Relationship/Rapport empathic listening provided   Family/Support Persons spouse;daughter   Involvement in Care at bedside;interacting with patient   Spiritual Care   Spiritual Care Visit Type initial   Spiritual Care Source  initiative   Receptivity to Spiritual Care visit welcomed   Spiritual Care Interventions supportive conversation provided   Response to Spiritual Care engaged in conversation;receptive of support;thanks expressed   Use of Spiritual Resources spirituality for coping, indicated strong use of   Spiritual Care Follow-Up follow-up, none required as presently assessed

## 2021-08-20 NOTE — SIGNIFICANT NOTE
08/20/21 1623   Plan   Plan SW followed up with pt at bedside regardng PT/OT recommendations. Pt is unsure if he wants to go back to rehab at this time. Pt states that he will think about discharg plan and keep SW updated. Pt is on PD, therefore [facility] is the only facility to accept PD. SW will continue to follow

## 2021-08-20 NOTE — PROGRESS NOTES
[facility]     Progress Note    Patient Name: Cristhian Watts  : 1935  MRN: [id number]  Primary Care Physician:  Dave Escobedo MD  Date of admission: 2021    Subjective   Patient is feeling little bit better and his appetite is very poor  On PD patient's fluid status is even  IV fluids are helping to improve his blood pressure  Review of Systems  Constitutional:       Weakness tiredness fatigue  Eyes:                      No blurry vision, eye discharge, eye irritation, eye pain  HEENT:                  No acute hair loss, earache and discharge, nasal congestion or discharge, sore throat, postnasal drip  Respiratory:          No shortness of breath coughing sputum production wheezing hemoptysis pleuritic chest pain  Cardiovascular:    No chest pain, orthopnea, PND, dizziness, palpitation, lower extremity edema  Gastrointestinal:  No nausea vomiting diarrhea abdominal pain constipation  Genitourinary:      No urinary incontinence, hesitancy, frequency, urgency, dysuria  Neurological:       No confusion, headache, focal weakness, numbness, dysphasia  Hematologic:        No bruising, bleeding, pallor, lymphadenopathy  Endocrine:           No coldness, hot flashes, polyuria, abnormal hair growth  Musculoskeletal:   No body pains, aches, arthritic pains, muscle pain ,muscle wasting  Psychiatric:         No low or high mood, anxiety, hallucinations, delusions  Skin.                     No rash, ulcers, bruising, itching    Objective   Objective     Vitals:   Temp:  [97.4 °F (36.3 °C)-98.4 °F (36.9 °C)] 98.4 °F (36.9 °C)  Heart Rate:  [] 99  Resp:  [16-20] 16  BP: ()/(50-84) 107/66  Physical Exam    Constitutional:        Awake, alert responsive, conversant, no obvious distress   Eyes:                      PERRLA, sclerae anicteric, no conjunctival injection   HEENT:                  Moist mucous membranes, no nasal or eye discharge, no throat congestion   Neck:                      Supple, no thyromegaly, no lymphadenopathy, trachea midline, no elevated JVD   Respiratory:          Clear to auscultation bilaterally, nonlabored respirations    Cardiovascular:    RRR, no murmurs, rubs, or gallops, palpable pedal pulses bilaterally,No bilateral ankle edema   Gastrointestinal:  Positive bowel sounds, soft, nontender, non distended, no organomegaly   Musculoskeletal: , no clubbing or cyanosis to extremities,muscle wasting, joint swelling, muscle weakness   Psychiatric:             Appropriate affect, cooperative   Neurologic:           Awake alert ,oriented x 3, strength symmetric in all extremities, Cranial Nerves grossly intact to confrontation, speech clear   Skin:                     No rashes , bruising's, skin ulcers, petechiae or ecchymosis    Result Review    Result Review:  I have personally reviewed the results from the time of this admission to 8/20/2021 13:16 EDT and agree with these findings:  []  Laboratory  []  Microbiology  []  Radiology  []  EKG/Telemetry   []  Cardiology/Vascular   []  Pathology  []  Old records  []  Other:    Assessment/Plan   Assessment / Plan       Active Hospital Problems:  Active Hospital Problems    Diagnosis    • Weakness    • Autonomic neuropathy    • ESRD (end stage renal disease) on dialysis (CMS/HCC)    • 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  Converted unresolved ICD9, potential mismatch.     • Paroxysmal atrial fibrillation (CMS/HCC)        Plan:   Continue IV fluids  Check orthostatics  Physical therapy       Electronically signed by Dave Escobedo MD, 08/20/21, 1:16 PM EDT.

## 2021-08-20 NOTE — THERAPY EVALUATION
Patient Name: Cristhian Watts  : 1935    MRN: [id number]                              Today's Date: 2021       Admit Date: 2021    Visit Dx:     ICD-10-CM ICD-9-CM   1. Weakness  R53.1 780.79   2. Chronic renal failure, unspecified CKD stage  N18.9 585.9   3. Hypomagnesemia  E83.42 275.2   4. Chronic anemia  D64.9 285.9   5. Atrial fibrillation with controlled ventricular response (CMS/[facility])  I48.91 427.31   6. Transient hypotension  I95.9 796.3   7. 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 (CMS/[facility])   • Chronic obstructive pulmonary disease with acute lower respiratory infection (CMS/[facility])   • Congestive heart failure (CMS/[facility])   • Elevated prostate specific antigen (PSA)   • Encounter for immunization   • Essential hypertension   • Ex-smoker   • Gastroesophageal reflux disease   • Gout   • Hereditary and idiopathic neuropathy, unspecified   • Hypertrophy of prostate without urinary obstruction and other lower urinary tract symptoms (LUTS)   • Type 2 diabetes mellitus (CMS/[facility])   • Kidney failure   • Mild intermittent asthma   • Mixed hyperlipidemia   • Multiple myeloma in remission (CMS/[facility])   • 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   • Obesity   • Obstructive sleep apnea   • Osteoporosis   • Overweight with body mass index (BMI) 25.0-29.9   • Pleural effusion due to bacterial infection   • Postnasal drip   • Proteinuria   • Shortness of breath   • Thyrotoxicosis   • Vitamin D deficiency   • Troponin level elevated   • Generalized weakness   • Presence of cardiac pacemaker   • Other specified hypothyroidism   • Weakness   • Autonomic neuropathy     Past Medical  History:   Diagnosis Date   • Basal cell carcinoma     REMOVED FROM FACE   • CHF (congestive heart failure) (CMS/HCC)     LAST EPISODE 8/2020   • Condition not found     BILATERAL HEARING AIDS   • Condition not found     CARDIAC ABLIATION 3/2018, LEO KNIGHT   • CPAP (continuous positive airway pressure) dependence    • Diabetes (CMS/HCC)     TYPE 2, BS IN THE -110   • Fistula     LEFT ARM, TUES&SAT   • Hyperlipidemia    • Hypertension     CONTROLLED WITH MEDS   • Hypothyroidism    • Myeloma (CMS/[facility])     MULTIPLE, REMISSION SINCE 2008   • Pacemaker     2018, WATCHMAN DEVICE AFTER PACEMAKER   • Paroxysmal A-fib (CMS/[facility])    • Renal disease     END STAGE   • Skin cancer of nose    • Troponin level elevated 7/26/2021   • Tuberculin skin 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     NEG   • Uses nebulizer and inhaler at home    • Wears glasses      Past Surgical History:   Procedure Laterality Date   • ARTERIOVENOUS FISTULA      LEFT ARM   • CARDIOVERSION      IN MAY 2018   • CATARACT EXTRACTION      BOTH EYES   • HEMORRHOIDECTOMY     • OTHER SURGICAL HISTORY      DENTAL IMPLANT   • OTHER SURGICAL HISTORY      ABLASION 3/2018   • PACEMAKER IMPLANTATION       General Information     Row Name 08/20/21 1010 08/20/21 1003       OT Time and Intention    Document Type  therapy note (daily note)  -PG  evaluation  -PG    Mode of Treatment  individual therapy;occupational therapy  -PG  individual therapy;occupational therapy  -PG    Row Name 08/20/21 1003          General Information    Patient Profile Reviewed  yes  -PG     Prior Level of Function  -- Patient is a poor historian but reportedly receiving considerable assistance at home with ADLs.  -PG     Existing Precautions/Restrictions  fall  -PG     Barriers to Rehab  medically complex;cognitive status  -PG     Row Name 08/20/21 1003          Occupational Profile    Reason for Services/Referral (Occupational Profile)  Decreased ADL performance  -PG     Row Name 08/20/21 1003           Living Environment    Lives With  spouse  -PG     Row Name 08/20/21 1003          Cognition    Orientation Status (Cognition)  verbal cues/prompts needed for orientation  -PG     Row Name 08/20/21 1003          Safety Issues, Functional Mobility    Safety Issues Affecting Function (Mobility)  ability to follow commands;safety precaution awareness;problem-solving;sequencing abilities;judgment;insight into deficits/self-awareness  -PG     Impairments Affecting Function (Mobility)  balance;cognition;strength  -PG     Cognitive Impairments, Mobility Safety/Performance  awareness, need for assistance;judgment;problem-solving/reasoning;safety precaution awareness;sequencing abilities  -PG       User Key  (r) = Recorded By, (t) = Taken By, (c) = Cosigned By    Initials Name Provider Type    PG William Platt OT Occupational Therapist          Mobility/ADL's     Row Name 08/20/21 1005          Transfers    Comment (Transfers)  Patient declined transfers.  Nursing reports assist x1 to bedside commode  -PG     Row Name 08/20/21 1005          Activities of Daily Living    BADL Assessment/Intervention  -- Dependent with all self-care and toileting  -PG       User Key  (r) = Recorded By, (t) = Taken By, (c) = Cosigned By    Initials Name Provider Type    PG William Platt OT Occupational Therapist        Obj/Interventions     Row Name 08/20/21 1005          Sensory Assessment (Somatosensory)    Sensory Assessment (Somatosensory)  unable/difficult to assess  -PG     Row Name 08/20/21 1005          Vision Assessment/Intervention    Visual Impairment/Limitations  unable/difficult to assess  -PG     Row Name 08/20/21 1005          Range of Motion Comprehensive    General Range of Motion  bilateral upper extremity ROM WNL  -PG     Row Name 08/20/21 1005          Strength Comprehensive (MMT)    Comment, General Manual Muscle Testing (MMT) Assessment  Bilateral upper extremity strength 4+/5  -PG     Row Name 08/20/21 1010           Shoulder (Therapeutic Exercise)    Shoulder (Therapeutic Exercise)  strengthening exercise  -PG     Shoulder Strengthening (Therapeutic Exercise)  10 repetitions;2 sets;1 lb free weight  -PG     Row Name 08/20/21 1010          Elbow/Forearm (Therapeutic Exercise)    Elbow/Forearm (Therapeutic Exercise)  strengthening exercise  -PG     Elbow/Forearm Strengthening (Therapeutic Exercise)  1 lb free weight;2 sets;10 repetitions  -PG     Row Name 08/20/21 1005          Motor Skills    Motor Skills  coordination;functional endurance  -PG     Coordination  WFL  -PG     Functional Endurance  Poor plus  -PG     Row Name 08/20/21 1010          Therapeutic Exercise    Therapeutic Exercise  shoulder;elbow/forearm  -PG       User Key  (r) = Recorded By, (t) = Taken By, (c) = Cosigned By    Initials Name Provider Type    PG William Platt, OT Occupational Therapist        Goals/Plan     Row Name 08/20/21 1008          Transfer Goal 1 (OT)    Activity/Assistive Device (Transfer Goal 1, OT)  transfers, all  -PG     [place] Level/Cues Needed (Transfer Goal 1, OT)  contact guard assist  -PG     Time Frame (Transfer Goal 1, OT)  long term goal (LTG);10 days  -PG     Row Name 08/20/21 1008          Bathing Goal 1 (OT)    Activity/Device (Bathing Goal 1, OT)  bathing skills, all  -PG     [place] Level/Cues Needed (Bathing Goal 1, OT)  minimum assist (75% or more patient effort)  -PG     Time Frame (Bathing Goal 1, OT)  long term goal (LTG);10 days  -PG     Row Name 08/20/21 1008          Dressing Goal 1 (OT)    Activity/Device (Dressing Goal 1, OT)  dressing skills, all  -PG     [place]/Cues Needed (Dressing Goal 1, OT)  minimum assist (75% or more patient effort)  -PG     Time Frame (Dressing Goal 1, OT)  long term goal (LTG);10 days  -PG     Row Name 08/20/21 1008          Toileting Goal 1 (OT)    Activity/Device (Toileting Goal 1, OT)  toileting skills, all  -PG     [place] Level/Cues Needed (Toileting Goal 1, OT)   minimum assist (75% or more patient effort)  -PG     Time Frame (Toileting Goal 1, OT)  long term goal (LTG);10 days  -PG     Row Name 08/20/21 1008          Grooming Goal 1 (OT)    Activity/Device (Grooming Goal 1, OT)  grooming skills, all  -PG     [place] (Grooming Goal 1, OT)  minimum assist (75% or more patient effort)  -PG     Time Frame (Grooming Goal 1, OT)  long term goal (LTG);10 days  -PG     Row Name 08/20/21 1008          Therapy Assessment/Plan (OT)    Planned Therapy Interventions (OT)  activity tolerance training;BADL retraining;occupation/activity based interventions;patient/caregiver education/training;transfer/mobility retraining;strengthening exercise  -PG       User Key  (r) = Recorded By, (t) = Taken By, (c) = Cosigned By    Initials Name Provider Type    PG William Platt, JENNY Occupational Therapist        Clinical Impression     Row Name 08/20/21 1006          Pain Assessment    Additional Documentation  Pain Scale: Numbers Pre/Post-Treatment (Group)  -PG     [facility] Name 08/20/21 1006          Pain Scale: Numbers Pre/Post-Treatment    Pretreatment Pain Rating  0/10 - no pain  -PG     Posttreatment Pain Rating  0/10 - no pain  -PG     Row Name 08/20/21 1006          Plan of Care Review    Plan of Care Reviewed With  patient  -PG     Progress  no change  -PG     Outcome Summary  Patient presents with limitations affecting strength, activity tolerance, and balance impacting patient's ability to return home safely and independently.  The skills of a therapist will be required to safely and effectively implement the following treatment plan to restore maximal level of function  -PG     Row Name 08/20/21 1006          Therapy Assessment/Plan (OT)    Patient/Family Therapy Goal Statement (OT)  Return home independently  -PG     Rehab Potential (OT)  good, to achieve stated therapy goals  -PG     Criteria for Skilled Therapeutic Interventions Met (OT)  yes;meets criteria;skilled treatment is  necessary  -PG     Therapy Frequency (OT)  5 times/wk  -PG     Row Name 08/20/21 1006          Therapy Plan Review/Discharge Plan (OT)    Anticipated Discharge Disposition (OT)  sub acute care setting  -PG       User Key  (r) = Recorded By, (t) = Taken By, (c) = Cosigned By    Initials Name Provider Type    PG William Platt OT Occupational Therapist        Outcome Measures     Row Name 08/20/21 1009          How much help from another is currently needed...    Putting on and taking off regular lower body clothing?  1  -PG     Bathing (including washing, rinsing, and drying)  1  -PG     Toileting (which includes using toilet bed pan or urinal)  1  -PG     Putting on and taking off regular upper body clothing  1  -PG     Taking care of personal grooming (such as brushing teeth)  1  -PG     Eating meals  2  -PG     AM-PAC 6 Clicks Score (OT)  7  -PG     Row Name 08/20/21 1009          Functional Assessment    Outcome Measure Options  AM-PAC 6 Clicks Daily Activity (OT);Optimal Instrument  -PG     Row Name 08/20/21 1009          Optimal Instrument    Optimal Instrument  Optimal - 3  -PG     Bending/Stooping  5  -PG     Standing  5  -PG     Reaching  2  -PG     From the list, choose the 3 activities you would most like to be able to do without any difficulty  Bending/stooping;Standing;Reaching  -PG     Total Score Optimal - 3  12  -PG       User Key  (r) = Recorded By, (t) = Taken By, (c) = Cosigned By    Initials Name Provider Type    PG William Platt OT Occupational Therapist          Occupational Therapy Education                 Title: PT OT SLP Therapies (In Progress)     Topic: Occupational Therapy (In Progress)     Point: ADL training (In Progress)     Description:   Instruct learner(s) on proper safety adaptation and remediation techniques during self care or transfers.   Instruct in proper use of assistive devices.              Learning Progress Summary           Patient Acceptance, D,E, NR by PG at 8/20/2021  1010                   Point: Home exercise program (In Progress)     Description:   Instruct learner(s) on appropriate technique for monitoring, assisting and/or progressing therapeutic exercises/activities.              Learning Progress Summary           Patient Acceptance, D,E, NR by PG at 8/20/2021 1010                   Point: Precautions (In Progress)     Description:   Instruct learner(s) on prescribed precautions during self-care and functional transfers.              Learning Progress Summary           Patient Acceptance, D,E, NR by PG at 8/20/2021 1010                   Point: Body mechanics (In Progress)     Description:   Instruct learner(s) on proper positioning and spine alignment during self-care, functional mobility activities and/or exercises.              Learning Progress Summary           Patient Acceptance, D,E, NR by PG at 8/20/2021 1010                               User Key     Initials Effective Dates Name Provider Type Discipline    PG 06/16/21 -  William Platt OT Occupational Therapist OT              OT Recommendation and Plan  Planned Therapy Interventions (OT): activity tolerance training, BADL retraining, occupation/activity based interventions, patient/caregiver education/training, transfer/mobility retraining, strengthening exercise  Therapy Frequency (OT): 5 times/wk  Plan of Care Review  Plan of Care Reviewed With: patient  Progress: no change  Outcome Summary: Patient presents with limitations affecting strength, activity tolerance, and balance impacting patient's ability to return home safely and independently.  The skills of a therapist will be required to safely and effectively implement the following treatment plan to restore maximal level of function     Time Calculation:   Time Calculation- OT     Row Name 08/20/21 1011             Time Calculation- OT    OT Received On  08/20/21  -PG      OT Goal Re-Cert Due Date  08/29/21  -PG         Timed Charges    41005 - OT  Therapeutic Exercise Minutes  10  -PG         Untimed Charges    OT Eval/Re-eval Minutes  30  -PG         Total Minutes    Timed Charges Total Minutes  10  -PG      Untimed Charges Total Minutes  30  -PG       Total Minutes  40  -PG        User Key  (r) = Recorded By, (t) = Taken By, (c) = Cosigned By    Initials Name Provider Type    PG William Platt OT Occupational Therapist        Therapy Charges for Today     Code Description Service Date Service Provider Modifiers Qty    [id number]  OT THER PROC EA 15 MIN 8/20/2021 William Platt OT GO 1    [id number]  OT EVAL LOW COMPLEXITY 2 8/20/2021 William Platt OT GO 1               William Platt OT  8/20/2021

## 2021-08-21 VITALS
HEART RATE: 86 BPM | WEIGHT: 217.81 LBS | OXYGEN SATURATION: 99 % | BODY MASS INDEX: 27.95 KG/M2 | HEIGHT: 74 IN | SYSTOLIC BLOOD PRESSURE: 102 MMHG | TEMPERATURE: 98.1 F | RESPIRATION RATE: 20 BRPM | DIASTOLIC BLOOD PRESSURE: 50 MMHG

## 2021-08-21 RX ORDER — PREDNISONE 10 MG/1
10 TABLET ORAL 3 TIMES WEEKLY
Status: DISCONTINUED | OUTPATIENT
Start: 2021-08-23 | End: 2021-08-21 | Stop reason: HOSPADM

## 2021-08-21 RX ORDER — CETIRIZINE HYDROCHLORIDE 10 MG/1
5 TABLET ORAL DAILY
Status: DISCONTINUED | OUTPATIENT
Start: 2021-08-21 | End: 2021-08-21 | Stop reason: HOSPADM

## 2021-08-21 RX ORDER — POTASSIUM CHLORIDE 750 MG/1
10 CAPSULE, EXTENDED RELEASE ORAL 2 TIMES DAILY
Status: DISCONTINUED | OUTPATIENT
Start: 2021-08-21 | End: 2021-08-21 | Stop reason: HOSPADM

## 2021-08-21 RX ADMIN — METOPROLOL TARTRATE 12.5 MG: 25 TABLET, FILM COATED ORAL at 07:44

## 2021-08-21 RX ADMIN — LEVOTHYROXINE SODIUM 200 MCG: 200 TABLET ORAL at 05:12

## 2021-08-21 RX ADMIN — MIDODRINE HYDROCHLORIDE 10 MG: 10 TABLET ORAL at 07:46

## 2021-08-21 RX ADMIN — CALCIUM ACETATE 1334 MG: 667 CAPSULE ORAL at 07:45

## 2021-08-21 RX ADMIN — FINASTERIDE 5 MG: 5 TABLET, FILM COATED ORAL at 07:46

## 2021-08-21 RX ADMIN — MONTELUKAST 10 MG: 10 TABLET, FILM COATED ORAL at 09:41

## 2021-08-21 RX ADMIN — POTASSIUM CHLORIDE 10 MEQ: 10 CAPSULE, COATED, EXTENDED RELEASE ORAL at 09:41

## 2021-08-21 RX ADMIN — Medication 5000 UNITS: at 07:50

## 2021-08-21 RX ADMIN — FAMOTIDINE 40 MG: 20 TABLET ORAL at 07:45

## 2021-08-21 RX ADMIN — GABAPENTIN 100 MG: 100 CAPSULE ORAL at 07:44

## 2021-08-21 RX ADMIN — DILTIAZEM HYDROCHLORIDE 120 MG: 120 CAPSULE, COATED, EXTENDED RELEASE ORAL at 07:48

## 2021-08-21 RX ADMIN — CETIRIZINE HYDROCHLORIDE 5 MG: 10 TABLET, FILM COATED ORAL at 07:51

## 2021-08-21 RX ADMIN — CALCITRIOL CAPSULES 0.25 MCG 0.5 MCG: 0.25 CAPSULE ORAL at 07:46

## 2021-08-21 NOTE — DISCHARGE SUMMARY
[facility]   DISCHARGE SUMMARY    Patient Name: Cristhian Watts  : 1935  MRN: [id number]    Date of Admission: 2021  Date of Discharge: 2021  Primary Care Physician: Dave Escobedo MD    Consults     Date and Time Order Name Status Description    2021 12:12 AM General MD Inpatient Consult Completed     2021  4:57 PM Inpatient Cardiology Consult Completed     2021  4:46 PM Cardiology (on-call MD unless specified) Completed     2021  4:46 PM Nephrology  (on-call MD unless specified) Completed           Hospital Course     Presenting Problem:   Hypomagnesemia [E83.42]  Weakness [R53.1]  Chronic anemia [D64.9]  Atrial fibrillation with controlled ventricular response (CMS/HCC) [I48.91]  Transient hypotension [I95.9]  Chronic renal failure, unspecified CKD stage [N18.9]    Active and resolved problems  Active Problems:    ESRD (end stage renal disease) on dialysis (CMS/HCC)  Overview:    Paroxysmal atrial fibrillation (CMS/HCC)  Overview:    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 *       Hospital Course:  Cristhian Watts is a 85 y.o. male with past medical history significant for multiple medical problems including congestive heart failure chronic atrial fibrillation metastatic prostate cancer ESRD peripheral neuropathy history of myeloma in the past was recently discharged from the rehab home and he was just so weak he could not get out of the bed and because of that reason patient was brought to the emergency room where he was found to be hypotensive.  On peritoneal dialysis there was a more removal with 1.5% exchanges which caused this patient to be hypotensive because of volume contraction.  During the hospitalization I give him IV fluids and his blood pressure improved and he is  feeling much better.  We have instructed his wife again that continue with 1.5% PD exchanges and if patient is fluid negative she needs to replace that fluid so he does not becomes intravascularly volume contracted and developed hypotension.  I will call my PD nurse on Monday and help retrain patient and his wife again about the fluid loss during PD.  Patient has history of atrial fibrillation and last night has RVR but is back under control again as patient is feeling well he will be discharged home and will see patient in PD clinic      Vital Signs:  Temp:  [97.9 °F (36.6 °C)-99.1 °F (37.3 °C)] 97.9 °F (36.6 °C)  Heart Rate:  [] 96  Resp:  [16-20] 20  BP: (105-123)/(50-72) 118/55      Discharge Details        Discharge Medications      Continue These Medications      Instructions Start Date   amiodarone 200 MG tablet  Commonly known as: PACERONE   200 mg, Oral, Daily      calcitriol 0.5 MCG capsule  Commonly known as: ROCALTROL   0.5 mcg, Oral, Daily      calcium acetate 667 MG capsule capsule  Commonly known as: PHOS BINDER)   1,334 mg, Oral, 3 Times Daily      cetirizine 10 MG tablet  Commonly known as: zyrTEC   10 mg, Oral, Daily      dilTIAZem  MG 24 hr capsule  Commonly known as: CARDIZEM CD   120 mg, Oral, Daily      docusate sodium 100 MG capsule  Commonly known as: COLACE   100 mg, Oral, Daily      finasteride 5 MG tablet  Commonly known as: PROSCAR   5 mg, Oral, Every Night at Bedtime      gabapentin 100 MG capsule  Commonly known as: NEURONTIN   100 mg, Oral, 2 Times Daily      levothyroxine 200 MCG tablet  Commonly known as: SYNTHROID, LEVOTHROID   200 mcg, Oral, Daily      metoprolol tartrate 25 MG tablet  Commonly known as: LOPRESSOR   12.5 mg, Oral, 3 Times Daily      midodrine 5 MG tablet  Commonly known as: PROAMATINE   10 mg, Oral, 2 Times Daily With Meals      montelukast 10 MG tablet  Commonly known as: SINGULAIR   10 mg, Oral, Daily      pantoprazole 40 MG EC tablet  Commonly known  as: PROTONIX   40 mg, Oral, Daily      potassium chloride 10 MEQ CR capsule  Commonly known as: MICRO-K   10 mEq, Oral, 2 Times Daily      predniSONE 10 MG tablet  Commonly known as: DELTASONE   10 mg, Oral, Every Other Day, Pt takes Monday, Wed, Fri      Symbicort 160-4.5 MCG/ACT inhaler  Generic drug: budesonide-formoterol   2 puffs, Inhalation, 2 Times Daily - RT      Ventolin  (90 Base) MCG/ACT inhaler  Generic drug: albuterol sulfate HFA   1 puff, Inhalation, Every 6 Hours PRN      vitamin D3 125 MCG (5000 UT) capsule capsule   5,000 Units, Oral, Daily             Allergies   Allergen Reactions   • Oxycodone Other (See Comments)     DOESN'T LIKE FEELING    • Renagel [Sevelamer] Unknown - Low Severity         Discharge Disposition:  Home or Self Care    Diet:  Renal  Be liberal on fluids      Discharge Activity:   As tolerated      CODE STATUS:    Code Status and Medical Interventions:   Ordered at: 08/19/21 1248     Code Status:    CPR     Medical Interventions (Level of Support Prior to Arrest):    Full         Future Appointments   Date Time Provider Department Center   11/8/2021 10:00 AM [facility] [place] DEVICE CHECK [facility]   11/8/2021 10:15 AM Christian Ocasio MD [facility]           Time spent on Discharge including face to face service: 30 minutes minutes    Electronically signed by Dave Escobedo MD, 08/21/21, 8:15 AM EDT.

## 2021-08-22 ENCOUNTER — READMISSION MANAGEMENT (OUTPATIENT)
Dept: CALL CENTER | Facility: HOSPITAL | Age: 86
End: 2021-08-22

## 2021-08-30 ENCOUNTER — APPOINTMENT (OUTPATIENT)
Dept: GENERAL RADIOLOGY | Facility: HOSPITAL | Age: 86
End: 2021-08-30

## 2021-08-30 ENCOUNTER — HOSPITAL ENCOUNTER (INPATIENT)
Facility: HOSPITAL | Age: 86
LOS: 2 days | Discharge: HOME HEALTH | End: 2021-09-02
Attending: EMERGENCY MEDICINE | Admitting: INTERNAL MEDICINE

## 2021-08-30 DIAGNOSIS — R53.1 WEAKNESS: ICD-10-CM

## 2021-08-30 DIAGNOSIS — Z20.822 SUSPECTED COVID-19 VIRUS INFECTION: ICD-10-CM

## 2021-08-30 DIAGNOSIS — Z78.9 DECREASED ACTIVITIES OF DAILY LIVING (ADL): ICD-10-CM

## 2021-08-30 DIAGNOSIS — J18.9 PNEUMONIA OF BOTH LUNGS DUE TO INFECTIOUS ORGANISM, UNSPECIFIED PART OF LUNG: Primary | ICD-10-CM

## 2021-08-30 DIAGNOSIS — R26.2 DIFFICULTY WALKING: ICD-10-CM

## 2021-08-30 LAB
ALBUMIN SERPL-MCNC: 1.9 G/DL (ref 3.5–5.2)
ALBUMIN/GLOB SERPL: 0.4 G/DL
ALP SERPL-CCNC: 386 U/L (ref 39–117)
ALT SERPL W P-5'-P-CCNC: 36 U/L (ref 1–41)
ANION GAP SERPL CALCULATED.3IONS-SCNC: 13.4 MMOL/L (ref 5–15)
AST SERPL-CCNC: 87 U/L (ref 1–40)
BASOPHILS # BLD AUTO: 0.02 10*3/MM3 (ref 0–0.2)
BASOPHILS NFR BLD AUTO: 0.1 % (ref 0–1.5)
BILIRUB SERPL-MCNC: 0.4 MG/DL (ref 0–1.2)
BUN SERPL-MCNC: 37 MG/DL (ref 8–23)
BUN/CREAT SERPL: 4.7 (ref 7–25)
CALCIUM SPEC-SCNC: 9.3 MG/DL (ref 8.6–10.5)
CHLORIDE SERPL-SCNC: 93 MMOL/L (ref 98–107)
CO2 SERPL-SCNC: 24.6 MMOL/L (ref 22–29)
CREAT SERPL-MCNC: 7.85 MG/DL (ref 0.76–1.27)
D-LACTATE SERPL-SCNC: 3.4 MMOL/L (ref 0.5–2)
D-LACTATE SERPL-SCNC: 3.4 MMOL/L (ref 0.5–2)
DEPRECATED RDW RBC AUTO: 55.1 FL (ref 37–54)
EOSINOPHIL # BLD AUTO: 0.26 10*3/MM3 (ref 0–0.4)
EOSINOPHIL NFR BLD AUTO: 1.8 % (ref 0.3–6.2)
ERYTHROCYTE [DISTWIDTH] IN BLOOD BY AUTOMATED COUNT: 14.7 % (ref 12.3–15.4)
GFR SERPL CREATININE-BSD FRML MDRD: 7 ML/MIN/1.73
GFR SERPL CREATININE-BSD FRML MDRD: ABNORMAL ML/MIN/{1.73_M2}
GLOBULIN UR ELPH-MCNC: 4.3 GM/DL
GLUCOSE SERPL-MCNC: 110 MG/DL (ref 65–99)
HCT VFR BLD AUTO: 33.7 % (ref 37.5–51)
HGB BLD-MCNC: 11.1 G/DL (ref 13–17.7)
HOLD SPECIMEN: NORMAL
HOLD SPECIMEN: NORMAL
IMM GRANULOCYTES # BLD AUTO: 0.13 10*3/MM3 (ref 0–0.05)
IMM GRANULOCYTES NFR BLD AUTO: 0.9 % (ref 0–0.5)
LYMPHOCYTES # BLD AUTO: 2.52 10*3/MM3 (ref 0.7–3.1)
LYMPHOCYTES NFR BLD AUTO: 17.9 % (ref 19.6–45.3)
MCH RBC QN AUTO: 33.4 PG (ref 26.6–33)
MCHC RBC AUTO-ENTMCNC: 32.9 G/DL (ref 31.5–35.7)
MCV RBC AUTO: 101.5 FL (ref 79–97)
MONOCYTES # BLD AUTO: 0.64 10*3/MM3 (ref 0.1–0.9)
MONOCYTES NFR BLD AUTO: 4.6 % (ref 5–12)
NEUTROPHILS NFR BLD AUTO: 10.49 10*3/MM3 (ref 1.7–7)
NEUTROPHILS NFR BLD AUTO: 74.7 % (ref 42.7–76)
NRBC BLD AUTO-RTO: 0 /100 WBC (ref 0–0.2)
NT-PROBNP SERPL-MCNC: ABNORMAL PG/ML (ref 0–1800)
PLATELET # BLD AUTO: 268 10*3/MM3 (ref 140–450)
PMV BLD AUTO: 9.9 FL (ref 6–12)
POTASSIUM SERPL-SCNC: 5.1 MMOL/L (ref 3.5–5.2)
PROT SERPL-MCNC: 6.2 G/DL (ref 6–8.5)
RBC # BLD AUTO: 3.32 10*6/MM3 (ref 4.14–5.8)
SODIUM SERPL-SCNC: 131 MMOL/L (ref 136–145)
TROPONIN T SERPL-MCNC: 0.21 NG/ML (ref 0–0.03)
WBC # BLD AUTO: 14.06 10*3/MM3 (ref 3.4–10.8)
WHOLE BLOOD HOLD SPECIMEN: NORMAL
WHOLE BLOOD HOLD SPECIMEN: NORMAL

## 2021-08-30 PROCEDURE — 84484 ASSAY OF TROPONIN QUANT: CPT | Performed by: EMERGENCY MEDICINE

## 2021-08-30 PROCEDURE — 93010 ELECTROCARDIOGRAM REPORT: CPT | Performed by: INTERNAL MEDICINE

## 2021-08-30 PROCEDURE — 80053 COMPREHEN METABOLIC PANEL: CPT

## 2021-08-30 PROCEDURE — 25010000002 CEFEPIME PER 500 MG: Performed by: EMERGENCY MEDICINE

## 2021-08-30 PROCEDURE — 25010000002 CEFTRIAXONE PER 250 MG: Performed by: INTERNAL MEDICINE

## 2021-08-30 PROCEDURE — 71045 X-RAY EXAM CHEST 1 VIEW: CPT

## 2021-08-30 PROCEDURE — 93005 ELECTROCARDIOGRAM TRACING: CPT

## 2021-08-30 PROCEDURE — 99285 EMERGENCY DEPT VISIT HI MDM: CPT

## 2021-08-30 PROCEDURE — G0378 HOSPITAL OBSERVATION PER HR: HCPCS

## 2021-08-30 PROCEDURE — 85025 COMPLETE CBC W/AUTO DIFF WBC: CPT

## 2021-08-30 PROCEDURE — 83880 ASSAY OF NATRIURETIC PEPTIDE: CPT

## 2021-08-30 PROCEDURE — 25010000002 VANCOMYCIN 5 G RECONSTITUTED SOLUTION: Performed by: EMERGENCY MEDICINE

## 2021-08-30 PROCEDURE — 93005 ELECTROCARDIOGRAM TRACING: CPT | Performed by: EMERGENCY MEDICINE

## 2021-08-30 PROCEDURE — 83605 ASSAY OF LACTIC ACID: CPT | Performed by: EMERGENCY MEDICINE

## 2021-08-30 PROCEDURE — 87040 BLOOD CULTURE FOR BACTERIA: CPT | Performed by: EMERGENCY MEDICINE

## 2021-08-30 RX ORDER — SODIUM CHLORIDE 9 MG/ML
100 INJECTION, SOLUTION INTRAVENOUS CONTINUOUS
Status: DISCONTINUED | OUTPATIENT
Start: 2021-08-30 | End: 2021-08-31

## 2021-08-30 RX ORDER — SODIUM CHLORIDE 0.9 % (FLUSH) 0.9 %
10 SYRINGE (ML) INJECTION AS NEEDED
Status: DISCONTINUED | OUTPATIENT
Start: 2021-08-30 | End: 2021-09-02 | Stop reason: HOSPADM

## 2021-08-30 RX ADMIN — VANCOMYCIN HYDROCHLORIDE 2000 MG: 5 INJECTION, POWDER, LYOPHILIZED, FOR SOLUTION INTRAVENOUS at 20:04

## 2021-08-30 RX ADMIN — SODIUM CHLORIDE 1 G: 900 INJECTION INTRAVENOUS at 19:21

## 2021-08-30 RX ADMIN — CEFEPIME HYDROCHLORIDE 2 G: 2 INJECTION, POWDER, FOR SOLUTION INTRAVENOUS at 19:27

## 2021-08-30 RX ADMIN — SODIUM CHLORIDE 100 ML/HR: 9 INJECTION, SOLUTION INTRAVENOUS at 19:12

## 2021-08-31 PROBLEM — J18.9 PNEUMONIA OF BOTH LUNGS DUE TO INFECTIOUS ORGANISM: Status: ACTIVE | Noted: 2021-08-31

## 2021-08-31 LAB
GLUCOSE BLDC GLUCOMTR-MCNC: 106 MG/DL (ref 70–99)
GLUCOSE BLDC GLUCOMTR-MCNC: 96 MG/DL (ref 70–99)
SARS-COV-2 RNA RESP QL NAA+PROBE: DETECTED

## 2021-08-31 PROCEDURE — 25010000002 HYDROMORPHONE 1 MG/ML SOLUTION: Performed by: INTERNAL MEDICINE

## 2021-08-31 PROCEDURE — U0003 INFECTIOUS AGENT DETECTION BY NUCLEIC ACID (DNA OR RNA); SEVERE ACUTE RESPIRATORY SYNDROME CORONAVIRUS 2 (SARS-COV-2) (CORONAVIRUS DISEASE [COVID-19]), AMPLIFIED PROBE TECHNIQUE, MAKING USE OF HIGH THROUGHPUT TECHNOLOGIES AS DESCRIBED BY CMS-2020-01-R: HCPCS | Performed by: INTERNAL MEDICINE

## 2021-08-31 PROCEDURE — 82962 GLUCOSE BLOOD TEST: CPT

## 2021-08-31 PROCEDURE — 25010000002 CEFTRIAXONE PER 250 MG: Performed by: INTERNAL MEDICINE

## 2021-08-31 PROCEDURE — 25010000002 HEPARIN (PORCINE) PER 1000 UNITS: Performed by: INTERNAL MEDICINE

## 2021-08-31 PROCEDURE — 94799 UNLISTED PULMONARY SVC/PX: CPT

## 2021-08-31 RX ORDER — AMOXICILLIN 250 MG
2 CAPSULE ORAL 2 TIMES DAILY
Status: DISCONTINUED | OUTPATIENT
Start: 2021-08-31 | End: 2021-09-02 | Stop reason: HOSPADM

## 2021-08-31 RX ORDER — BISACODYL 10 MG
10 SUPPOSITORY, RECTAL RECTAL DAILY PRN
Status: DISCONTINUED | OUTPATIENT
Start: 2021-08-31 | End: 2021-09-02 | Stop reason: HOSPADM

## 2021-08-31 RX ORDER — NALOXONE HCL 0.4 MG/ML
0.4 VIAL (ML) INJECTION
Status: DISCONTINUED | OUTPATIENT
Start: 2021-08-31 | End: 2021-09-02 | Stop reason: HOSPADM

## 2021-08-31 RX ORDER — CHOLECALCIFEROL (VITAMIN D3) 125 MCG
5 CAPSULE ORAL NIGHTLY PRN
Status: DISCONTINUED | OUTPATIENT
Start: 2021-08-31 | End: 2021-09-02 | Stop reason: HOSPADM

## 2021-08-31 RX ORDER — ACETAMINOPHEN 325 MG/1
650 TABLET ORAL EVERY 4 HOURS PRN
Status: DISCONTINUED | OUTPATIENT
Start: 2021-08-31 | End: 2021-09-02 | Stop reason: HOSPADM

## 2021-08-31 RX ORDER — ACETAMINOPHEN 160 MG/5ML
650 SOLUTION ORAL EVERY 4 HOURS PRN
Status: DISCONTINUED | OUTPATIENT
Start: 2021-08-31 | End: 2021-09-02 | Stop reason: HOSPADM

## 2021-08-31 RX ORDER — ALPRAZOLAM 0.25 MG/1
0.5 TABLET ORAL EVERY 8 HOURS PRN
Status: DISCONTINUED | OUTPATIENT
Start: 2021-08-31 | End: 2021-09-02 | Stop reason: HOSPADM

## 2021-08-31 RX ORDER — POLYETHYLENE GLYCOL 3350 17 G/17G
17 POWDER, FOR SOLUTION ORAL DAILY PRN
Status: DISCONTINUED | OUTPATIENT
Start: 2021-08-31 | End: 2021-09-02 | Stop reason: HOSPADM

## 2021-08-31 RX ORDER — FAMOTIDINE 20 MG/1
40 TABLET, FILM COATED ORAL DAILY
Status: DISCONTINUED | OUTPATIENT
Start: 2021-08-31 | End: 2021-09-02 | Stop reason: HOSPADM

## 2021-08-31 RX ORDER — BISACODYL 5 MG/1
5 TABLET, DELAYED RELEASE ORAL DAILY PRN
Status: DISCONTINUED | OUTPATIENT
Start: 2021-08-31 | End: 2021-09-02 | Stop reason: HOSPADM

## 2021-08-31 RX ORDER — ACETAMINOPHEN 650 MG/1
650 SUPPOSITORY RECTAL EVERY 4 HOURS PRN
Status: DISCONTINUED | OUTPATIENT
Start: 2021-08-31 | End: 2021-09-02 | Stop reason: HOSPADM

## 2021-08-31 RX ORDER — ALUMINA, MAGNESIA, AND SIMETHICONE 2400; 2400; 240 MG/30ML; MG/30ML; MG/30ML
15 SUSPENSION ORAL EVERY 6 HOURS PRN
Status: DISCONTINUED | OUTPATIENT
Start: 2021-08-31 | End: 2021-09-02 | Stop reason: HOSPADM

## 2021-08-31 RX ORDER — HYDROCODONE BITARTRATE AND ACETAMINOPHEN 10; 325 MG/1; MG/1
1 TABLET ORAL EVERY 4 HOURS PRN
Status: DISCONTINUED | OUTPATIENT
Start: 2021-08-31 | End: 2021-09-02 | Stop reason: HOSPADM

## 2021-08-31 RX ORDER — ONDANSETRON 2 MG/ML
4 INJECTION INTRAMUSCULAR; INTRAVENOUS EVERY 6 HOURS PRN
Status: DISCONTINUED | OUTPATIENT
Start: 2021-08-31 | End: 2021-09-02 | Stop reason: HOSPADM

## 2021-08-31 RX ORDER — HYDROCODONE BITARTRATE AND ACETAMINOPHEN 5; 325 MG/1; MG/1
1 TABLET ORAL EVERY 4 HOURS PRN
Status: DISCONTINUED | OUTPATIENT
Start: 2021-08-31 | End: 2021-09-02 | Stop reason: HOSPADM

## 2021-08-31 RX ADMIN — DOCUSATE SODIUM 50MG AND SENNOSIDES 8.6MG 2 TABLET: 8.6; 5 TABLET, FILM COATED ORAL at 21:28

## 2021-08-31 RX ADMIN — SODIUM CHLORIDE, SODIUM LACTATE, CALCIUM CHLORIDE, MAGNESIUM CHLORIDE AND DEXTROSE: 1.5; 538; 448; 18.3; 5.08 INJECTION, SOLUTION INTRAPERITONEAL at 15:00

## 2021-08-31 RX ADMIN — DOCUSATE SODIUM 50MG AND SENNOSIDES 8.6MG 2 TABLET: 8.6; 5 TABLET, FILM COATED ORAL at 01:55

## 2021-08-31 RX ADMIN — SODIUM CHLORIDE, SODIUM LACTATE, CALCIUM CHLORIDE, MAGNESIUM CHLORIDE AND DEXTROSE: 1.5; 538; 448; 18.3; 5.08 INJECTION, SOLUTION INTRAPERITONEAL at 23:00

## 2021-08-31 RX ADMIN — SODIUM CHLORIDE 1 G: 900 INJECTION INTRAVENOUS at 18:17

## 2021-08-31 RX ADMIN — MICONAZOLE NITRATE: 2 POWDER TOPICAL at 12:15

## 2021-08-31 RX ADMIN — FAMOTIDINE 40 MG: 20 TABLET, FILM COATED ORAL at 10:14

## 2021-08-31 RX ADMIN — HYDROMORPHONE HYDROCHLORIDE 0.5 MG: 1 INJECTION, SOLUTION INTRAMUSCULAR; INTRAVENOUS; SUBCUTANEOUS at 00:47

## 2021-08-31 RX ADMIN — MICONAZOLE NITRATE: 2 POWDER TOPICAL at 21:28

## 2021-09-01 PROBLEM — U07.1 COVID-19 VIRUS INFECTION: Status: ACTIVE | Noted: 2021-09-01

## 2021-09-01 PROCEDURE — 25010000002 DEXAMETHASONE PER 1 MG: Performed by: INTERNAL MEDICINE

## 2021-09-01 PROCEDURE — 25010000002 ONDANSETRON PER 1 MG: Performed by: INTERNAL MEDICINE

## 2021-09-01 PROCEDURE — 25010000002 HEPARIN (PORCINE) PER 1000 UNITS: Performed by: INTERNAL MEDICINE

## 2021-09-01 PROCEDURE — 97161 PT EVAL LOW COMPLEX 20 MIN: CPT

## 2021-09-01 PROCEDURE — 25010000002 CEFTRIAXONE PER 250 MG: Performed by: INTERNAL MEDICINE

## 2021-09-01 PROCEDURE — 97165 OT EVAL LOW COMPLEX 30 MIN: CPT

## 2021-09-01 RX ORDER — DEXAMETHASONE SODIUM PHOSPHATE 10 MG/ML
8 INJECTION INTRAMUSCULAR; INTRAVENOUS DAILY
Status: DISCONTINUED | OUTPATIENT
Start: 2021-09-01 | End: 2021-09-02 | Stop reason: HOSPADM

## 2021-09-01 RX ADMIN — SODIUM CHLORIDE, PRESERVATIVE FREE 10 ML: 5 INJECTION INTRAVENOUS at 09:37

## 2021-09-01 RX ADMIN — FAMOTIDINE 40 MG: 20 TABLET, FILM COATED ORAL at 09:37

## 2021-09-01 RX ADMIN — MICONAZOLE NITRATE: 2 POWDER TOPICAL at 09:37

## 2021-09-01 RX ADMIN — ONDANSETRON 4 MG: 2 INJECTION INTRAMUSCULAR; INTRAVENOUS at 09:38

## 2021-09-01 RX ADMIN — SODIUM CHLORIDE, SODIUM LACTATE, CALCIUM CHLORIDE, MAGNESIUM CHLORIDE AND DEXTROSE: 1.5; 538; 448; 18.3; 5.08 INJECTION, SOLUTION INTRAPERITONEAL at 22:30

## 2021-09-01 RX ADMIN — SODIUM CHLORIDE, SODIUM LACTATE, CALCIUM CHLORIDE, MAGNESIUM CHLORIDE AND DEXTROSE: 1.5; 538; 448; 18.3; 5.08 INJECTION, SOLUTION INTRAPERITONEAL at 18:08

## 2021-09-01 RX ADMIN — SODIUM CHLORIDE, SODIUM LACTATE, CALCIUM CHLORIDE, MAGNESIUM CHLORIDE AND DEXTROSE: 1.5; 538; 448; 18.3; 5.08 INJECTION, SOLUTION INTRAPERITONEAL at 10:28

## 2021-09-01 RX ADMIN — SODIUM CHLORIDE, SODIUM LACTATE, CALCIUM CHLORIDE, MAGNESIUM CHLORIDE AND DEXTROSE: 1.5; 538; 448; 18.3; 5.08 INJECTION, SOLUTION INTRAPERITONEAL at 14:30

## 2021-09-01 RX ADMIN — SODIUM CHLORIDE 1 G: 900 INJECTION INTRAVENOUS at 20:14

## 2021-09-01 RX ADMIN — DEXAMETHASONE SODIUM PHOSPHATE 8 MG: 10 INJECTION INTRAMUSCULAR; INTRAVENOUS at 10:28

## 2021-09-01 RX ADMIN — MICONAZOLE NITRATE: 2 POWDER TOPICAL at 20:15

## 2021-09-01 NOTE — SIGNIFICANT NOTE
09/01/21 1053   Plan   Plan SW placed call to pts room to discuss discharge planning. Pt states he plans to return home at discharge with his wife. Pt consented for SW to contact wife to confirm. SW placed call to wife and wife confirmed that pt will be discharging home when ready. SW will notify [facility] home health once patient is discharged.

## 2021-09-01 NOTE — PLAN OF CARE
Goal Outcome Evaluation:  Plan of Care Reviewed With: patient        Progress: no change  Outcome Summary: Pt presents with limitations that impede their ability to safely and independently transfer and ambulate. The skills of a therapist will be required to safely and effectively implement the following treatment plan to restore maximal level of function.

## 2021-09-01 NOTE — ED PROVIDER NOTES
Subjective    History of Present Illness    85 all-male patient presents with complaint of having recent diagnosis of pneumonia and not getting better. Patient is also a dialysis patient.  Review of Systems   Constitutional: Positive for fatigue. Negative for chills and fever.   HENT: Negative.  Negative for sore throat.    Eyes: Negative.  Negative for photophobia.   Respiratory: Positive for cough and shortness of breath.    Cardiovascular: Negative.  Negative for chest pain.   Gastrointestinal: Negative.  Negative for abdominal pain, diarrhea, nausea and vomiting.   Endocrine: Negative.    Genitourinary: Negative.  Negative for dysuria.   Musculoskeletal: Positive for arthralgias. Negative for neck pain.   Skin: Negative.  Negative for wound.   Neurological: Negative.  Negative for headaches.   Hematological: Negative.    Psychiatric/Behavioral: Negative.    All other systems reviewed and are negative.      Past Medical History:   Diagnosis Date   • Basal cell carcinoma     REMOVED FROM FACE   • CHF (congestive heart failure) (CMS/HCC)     LAST EPISODE 8/2020   • Condition not found     BILATERAL HEARING AIDS   • Condition not found     CARDIAC ABLIATION 3/2018, [facility]   • CPAP (continuous positive airway pressure) dependence    • Diabetes (CMS/HCC)     TYPE 2, BS IN THE -110   • Fistula     LEFT ARM, TUES&SAT   • Hyperlipidemia    • Hypertension     CONTROLLED WITH MEDS   • Hypothyroidism    • Myeloma (CMS/HCC)     MULTIPLE, REMISSION SINCE 2008   • Pacemaker     2018, WATCHMAN DEVICE AFTER PACEMAKER   • Paroxysmal A-fib (CMS/HCC)    • Renal disease     END STAGE   • Skin cancer of nose    • Troponin level elevated 7/26/2021   • Tuberculin skin 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     NEG   • Uses nebulizer and inhaler at home    • Wears glasses        Allergies   Allergen Reactions   • Oxycodone Other (See Comments)     DOESN'T LIKE FEELING    • Renagel [Sevelamer] Unknown - Low Severity       Past Surgical History:    Procedure Laterality Date   • ARTERIOVENOUS FISTULA      LEFT ARM   • CARDIOVERSION      IN MAY 2018   • CATARACT EXTRACTION      BOTH EYES   • HEMORRHOIDECTOMY     • OTHER SURGICAL HISTORY      DENTAL IMPLANT   • OTHER SURGICAL HISTORY      ABLASION 3/2018   • PACEMAKER IMPLANTATION         History reviewed. No pertinent family history.    Social History     Socioeconomic History   • Marital status:      Spouse name: Not on file   • Number of children: Not on file   • Years of education: Not on file   • Highest education level: Not on file   Tobacco Use   • Smoking status: Former Smoker     Quit date: 1980     Years since quittin.1   • Smokeless tobacco: Former User     Quit date: 1992   • Tobacco comment: smoked cigars on occasion   Vaping Use   • Vaping Use: Never used   Substance and Sexual Activity   • Alcohol use: Not Currently   • Drug use: Never   • Sexual activity: Defer           Objective   Physical Exam  Vitals and nursing note reviewed.   Constitutional:       Appearance: He is well-developed.   HENT:      Head: Normocephalic.   Cardiovascular:      Rate and Rhythm: Tachycardia present.      Heart sounds: No murmur heard.     Pulmonary:      Effort: Pulmonary effort is normal.      Comments: Breath sounds diminished  Abdominal:      General: Bowel sounds are normal.      Palpations: Abdomen is soft.   Musculoskeletal:         General: Normal range of motion.   Skin:     General: Skin is warm and dry.   Neurological:      General: No focal deficit present.      Mental Status: He is alert and oriented to person, place, and time.   Psychiatric:         Mood and Affect: Mood normal.         Behavior: Behavior normal.         Procedures           ED Course  ED Course as of Sep 01 1230   Mon Aug 30, 2021   1912 EKG:    Rhythm: paced  Rate: 90  Intervals: normal  T-wave: flattened  ST Segment: non-specific    EKG Comparison: 21 changed    Interpreted by me      [NL]      ED Course  User Index  [NL] Joe Molina DO                                           [facility]     85-year-old male patient presented with complaints of worsening pneumonia. Patient  Is a dialysis patient and his nephrologist, Dr. Escobedo,  Is his primary care physician.  Patient's workup  Shows an elevated white blood cell count. His chest x-rays concerning for a covid pattern.  Patient was given IV antibiotics in the emergency department. Patient will be admitted by Dr. Escobedo.      Final diagnoses:   Pneumonia of both lungs due to infectious organism, unspecified part of lung   Suspected COVID-19 virus infection   Weakness       ED Disposition  ED Disposition     ED Disposition Condition Comment    Decision to Admit  Level of Care: Med/Surg [1]   Diagnosis: Weakness [[id number]]   Admitting Physician: SHEBA ESCOBEDO [[id number]]   Attending Physician: SHEBA ESCOBEDO [[id number]]            No follow-up provider specified.       Medication List      No changes were made to your prescriptions during this visit.          Joe Molina DO  09/01/21 1239

## 2021-09-01 NOTE — PROGRESS NOTES
[facility]     Progress Note    Patient Name: Cristhian Watts  : 1935  MRN: [id number]  Primary Care Physician:  Dave Escobedo MD  Date of admission: 2021    Subjective   Patient is feeling relatively better today  He is still weak  Patient is Covid positive  He was vaccinated in March  Review of Systems  Constitutional:       Weakness tiredness fatigue  Eyes:                      No blurry vision, eye discharge, eye irritation, eye pain  HEENT:                  No acute hair loss, earache and discharge, nasal congestion or discharge, sore throat, postnasal drip  Respiratory:          No shortness of breath coughing sputum production wheezing hemoptysis pleuritic chest pain  Cardiovascular:    No chest pain, orthopnea, PND, dizziness, palpitation, lower extremity edema  Gastrointestinal:  No nausea vomiting diarrhea abdominal pain constipation  Genitourinary:      No urinary incontinence, hesitancy, frequency, urgency, dysuria  Neurological:       No confusion, headache, focal weakness, numbness, dysphasia  Hematologic:        No bruising, bleeding, pallor, lymphadenopathy  Endocrine:           No coldness, hot flashes, polyuria, abnormal hair growth  Musculoskeletal:   No body pains, aches, arthritic pains, muscle pain ,muscle wasting  Psychiatric:         No low or high mood, anxiety, hallucinations, delusions  Skin.                     No rash, ulcers, bruising, itching    Objective   Objective     Vitals:   Temp:  [97 °F (36.1 °C)-98.2 °F (36.8 °C)] 97.6 °F (36.4 °C)  Heart Rate:  [107-121] 114  Resp:  [16-18] 18  BP: (103-136)/(53-89) 107/62  Flow (L/min):  [1] 1  Physical Exam    Constitutional:        Awake, alert responsive, conversant, no obvious distress   Eyes:                      PERRLA, sclerae anicteric, no conjunctival injection   HEENT:                  Moist mucous membranes, no nasal or eye discharge, no throat congestion   Neck:                     Supple, no thyromegaly,  no lymphadenopathy, trachea midline, no elevated JVD   Respiratory:          Clear to auscultation bilaterally, nonlabored respirations    Cardiovascular:    RRR, no murmurs, rubs, or gallops, palpable pedal pulses bilaterally,No bilateral ankle edema   Gastrointestinal:  Positive bowel sounds, soft, nontender, non distended, no organomegaly   Musculoskeletal: , no clubbing or cyanosis to extremities,muscle wasting, joint swelling, muscle weakness   Psychiatric:             Appropriate affect, cooperative   Neurologic:           Awake alert ,oriented x 3, strength symmetric in all extremities, Cranial Nerves grossly intact to confrontation, speech clear   Skin:                     No rashes , bruising's, skin ulcers, petechiae or ecchymosis    Result Review    Result Review:  I have personally reviewed the results from the time of this admission to 9/1/2021 09:55 EDT and agree with these findings:  []  Laboratory  []  Microbiology  []  Radiology  []  EKG/Telemetry   []  Cardiology/Vascular   []  Pathology  []  Old records  []  Other:    Assessment/Plan   Assessment / Plan       Active Hospital Problems:  Active Hospital Problems    Diagnosis    • COVID-19 virus infection    • Pneumonia of both lungs due to infectious organism    • Weakness    • ESRD (end stage renal disease) on dialysis (CMS/HCC)    • 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 (CMS/[facility])    • Anemia    • Chronic obstructive pulmonary disease with acute lower respiratory infection (CMS/HCC)    Patient has been to this hospital and most likely he acquired infection in the hospital as he is at home only with his wife and have no other exposure    Plan:   We will start patient on Decadron  He has no respiratory symptoms at this time  His O2 sat is 98% on room air  Continue peritoneal dialysis  And if patient is clinically stable then can be discharged home in a day or so      Electronically signed by Dave Escobedo MD, 09/01/21, 9:49 AM EDT.

## 2021-09-01 NOTE — PLAN OF CARE
Goal Outcome Evaluation:  Plan of Care Reviewed With: patient        Progress: no change  Outcome Summary: Patient presents with limitations that impede his/her ability to perform ADLS. The skills of a therapist are necessary to maximize independence with ADLs.

## 2021-09-01 NOTE — THERAPY EVALUATION
Patient Name: Cristhian Watts  : 1935    MRN: [id number]                              Today's Date: 2021       Admit Date: 2021    Visit Dx:     ICD-10-CM ICD-9-CM   1. Pneumonia of both lungs due to infectious organism, unspecified part of lung  J18.9 483.8   2. Suspected COVID-19 virus infection  Z20.822 V01.79   3. Weakness  R53.1 780.79   4. Difficulty walking  R26.2 719.7   5. 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 failure   • 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 (CMS/[facility])   • Chronic obstructive pulmonary disease with acute lower respiratory infection (CMS/[facility])   • Congestive heart failure (CMS/[facility])   • Elevated prostate specific antigen (PSA)   • Encounter for immunization   • Essential hypertension   • Ex-smoker   • Gastroesophageal reflux disease   • Gout   • Hereditary and idiopathic neuropathy, unspecified   • Hypertrophy of prostate without urinary obstruction and other lower urinary tract symptoms (LUTS)   • Type 2 diabetes mellitus (CMS/[facility])   • Kidney failure   • Mild intermittent asthma   • Mixed hyperlipidemia   • Multiple myeloma in remission (CMS/[facility])   • 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   • Obesity   • Obstructive sleep apnea   • Osteoporosis   • Overweight with body mass index (BMI) 25.0-29.9   • Pleural effusion due to bacterial infection   • Postnasal drip   • Proteinuria   • Shortness of breath   • Thyrotoxicosis   • Vitamin D deficiency   • Troponin level elevated   • Generalized weakness   • Presence of cardiac pacemaker   • Other specified hypothyroidism   • Weakness   • Autonomic neuropathy   • Pneumonia of both lungs due to infectious organism   • COVID-19 virus infection      Past Medical History:   Diagnosis Date   • Basal cell carcinoma     REMOVED FROM FACE   • CHF (congestive heart failure) (CMS/[facility])     LAST EPISODE 8/2020   • Condition not found     BILATERAL HEARING AIDS   • Condition not found     CARDIAC ABLIATION 3/2018, LEO KNIGHT   • CPAP (continuous positive airway pressure) dependence    • Diabetes (CMS/[facility])     TYPE 2, BS IN THE -110   • Fistula     LEFT ARM, TUES&SAT   • Hyperlipidemia    • Hypertension     CONTROLLED WITH MEDS   • Hypothyroidism    • Myeloma (CMS/[facility])     MULTIPLE, REMISSION SINCE 2008   • Pacemaker     2018, WATCHMAN DEVICE AFTER PACEMAKER   • Paroxysmal A-fib (CMS/[facility])    • Renal disease     END STAGE   • Skin cancer of nose    • Troponin level elevated 7/26/2021   • Tuberculin skin 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     NEG   • Uses nebulizer and inhaler at home    • Wears glasses      Past Surgical History:   Procedure Laterality Date   • ARTERIOVENOUS FISTULA      LEFT ARM   • CARDIOVERSION      IN MAY 2018   • CATARACT EXTRACTION      BOTH EYES   • HEMORRHOIDECTOMY     • OTHER SURGICAL HISTORY      DENTAL IMPLANT   • OTHER SURGICAL HISTORY      ABLASION 3/2018   • PACEMAKER IMPLANTATION       General Information     Row Name 09/01/21 1531          OT Time and Intention    Document Type  evaluation  -AC     Mode of Treatment  individual therapy;occupational therapy  -AC     Row Name 09/01/21 1534          General Information    Patient Profile Reviewed  yes  -AC     Prior Level of Function  independent: pt. reports he was (I) with adls used AD occassionally. pt. states he has a shower stool and elevated toilet seat.  -AC     Existing Precautions/Restrictions  fall  -AC     Barriers to Rehab  none identified  -AC     Row Name 09/01/21 1532          Occupational Profile    Reason for Services/Referral (Occupational Profile)  Pt. is a 85 year old male admitted for the above diagnosis. Pt. referred to OT services to assess independence with ADLs and adl  transfers/fx'l mobility. No previous OT services for current condition.  -     Row Name 09/01/21 1531          Living Environment    Lives With  spouse  -     Row Name 09/01/21 1531          Cognition    Orientation Status (Cognition)  oriented to;person;place  -     Row Name 09/01/21 1531          Safety Issues, Functional Mobility    Safety Issues Affecting Function (Mobility)  ability to follow commands  -     Impairments Affecting Function (Mobility)  balance;endurance/activity tolerance  -       User Key  (r) = Recorded By, (t) = Taken By, (c) = Cosigned By    Initials Name Provider Type     Rina Dick OT Occupational Therapist          Mobility/ADL's     Row Name 09/01/21 1533          Bed Mobility    Bed Mobility  supine-sit-supine  -     Supine-Sit [place] (Bed Mobility)  moderate assist (50% patient effort);maximum assist (25% patient effort);1 person assist  -     Supine-Sit-Supine [place] (Bed Mobility)  moderate assist (50% patient effort);maximum assist (25% patient effort)  -     Bed Mobility, Safety Issues  decreased use of legs for bridging/pushing;decreased use of arms for pushing/pulling  -     Assistive Device (Bed Mobility)  bed rails;head of bed elevated;draw sheet  -     Row Name 09/01/21 1533          Transfers    Transfers  sit-stand transfer  -     Comment (Transfers)  unable to come to full stand.  -     Sit-Stand [place] (Transfers)  moderate assist (50% patient effort)  -     Row Name 09/01/21 1533          Activities of Daily Living    BADL Assessment/Intervention  -- patient is max/dep for lower body adls, mcclellan for self-feeding/grooming and max A for UB dressing/bathing  -       User Key  (r) = Recorded By, (t) = Taken By, (c) = Cosigned By    Initials Name Provider Type     Rina Dick OT Occupational Therapist        Obj/Interventions     Row Name 09/01/21 1537          Sensory Assessment (Somatosensory)    Sensory Assessment  (Somatosensory)  sensation intact  -[facility] Name 09/01/21 1537          Vision Assessment/Intervention    Visual Impairment/Limitations  WNL  -[facility] Name 09/01/21 1537          Range of Motion Comprehensive    General Range of Motion  bilateral upper extremity ROM WNL  -[facility] Name 09/01/21 1537          Strength Comprehensive (MMT)    General Manual Muscle Testing (MMT) Assessment  no strength deficits identified  -[facility] Name 09/01/21 1537          Motor Skills    Motor Skills  coordination;functional endurance  -     Coordination  WFL  -     Functional Endurance  f-/f  -[facility] Name 09/01/21 1537          Balance    Balance Assessment  standing static balance  -     Static Standing Balance  severe impairment;moderate impairment  -       User Key  (r) = Recorded By, (t) = Taken By, (c) = Cosigned By    Initials Name Provider Type    AC Rina Dick OT Occupational Therapist        Goals/Plan     Row Name 09/01/21 1540          Bed Mobility Goal 1 (OT)    Activity/Assistive Device (Bed Mobility Goal 1, OT)  bed mobility activities, all  -AC     [place] Level/Cues Needed (Bed Mobility Goal 1, OT)  modified independence  -AC     Time Frame (Bed Mobility Goal 1, OT)  long term goal (LTG);10 days  -[facility] Name 09/01/21 1540          Transfer Goal 1 (OT)    Activity/Assistive Device (Transfer Goal 1, OT)  transfers, all  -AC     [place] Level/Cues Needed (Transfer Goal 1, OT)  modified independence  -AC     Time Frame (Transfer Goal 1, OT)  long term goal (LTG);10 days  -[facility] Name 09/01/21 1540          Bathing Goal 1 (OT)    Activity/Device (Bathing Goal 1, OT)  bathing skills, all  -AC     [place] Level/Cues Needed (Bathing Goal 1, OT)  modified independence  -AC     Time Frame (Bathing Goal 1, OT)  long term goal (LTG);10 days  -[facility] Name 09/01/21 1540          Dressing Goal 1 (OT)    Activity/Device (Dressing Goal 1, OT)  dressing skills, all  -AC      [place]/Cues Needed (Dressing Goal 1, OT)  modified independence  -AC     Time Frame (Dressing Goal 1, OT)  long term goal (LTG);10 days  -     Row Name 09/01/21 1540          Toileting Goal 1 (OT)    Activity/Device (Toileting Goal 1, OT)  toileting skills, all  -AC     [place] Level/Cues Needed (Toileting Goal 1, OT)  modified independence  -AC     Time Frame (Toileting Goal 1, OT)  long term goal (LTG);10 days  -AC     Row Name 09/01/21 1540          Grooming Goal 1 (OT)    Activity/Device (Grooming Goal 1, OT)  grooming skills, all  -AC     En[place] (Grooming Goal 1, OT)  modified independence  -AC     Time Frame (Grooming Goal 1, OT)  long term goal (LTG);10 days  -     Row Name 09/01/21 1540          Therapy Assessment/Plan (OT)    Planned Therapy Interventions (OT)  activity tolerance training;BADL retraining;functional balance retraining;occupation/activity based interventions;patient/caregiver education/training;transfer/mobility retraining;ROM/therapeutic exercise  -       User Key  (r) = Recorded By, (t) = Taken By, (c) = Cosigned By    Initials Name Provider Type    AC Rina Dick OT Occupational Therapist        Clinical Impression     Row Name 09/01/21 1540          Pain Assessment    Additional Documentation  Pain Scale: FACES Pre/Post-Treatment (Group)  -     Row Name 09/01/21 1540          Pain Scale: FACES Pre/Post-Treatment    Pain: FACES Scale, Pretreatment  0-->no hurt  -     Posttreatment Pain Rating  0-->no hurt  -     Row Name 09/01/21 1540          Plan of Care Review    Plan of Care Reviewed With  patient  -AC     Progress  no change  -     Outcome Summary  Patient presents with limitations that impede his/her ability to perform ADLS. The skills of a therapist are necessary to maximize independence with ADLs.  -     Row Name 09/01/21 1540          Therapy Assessment/Plan (OT)    Patient/Family Therapy Goal Statement (OT)  Patient would like to maximize  independence with adls.  -AC     Rehab Potential (OT)  good, to achieve stated therapy goals  -     Criteria for Skilled Therapeutic Interventions Met (OT)  yes;meets criteria;skilled treatment is necessary  -AC     Therapy Frequency (OT)  5 times/wk  -     Row Name 09/01/21 1540          Therapy Plan Review/Discharge Plan (OT)    Anticipated Discharge Disposition (OT)  inpatient rehabilitation facility  -       User Key  (r) = Recorded By, (t) = Taken By, (c) = Cosigned By    Initials Name Provider Type    AC Rina Dick OT Occupational Therapist        Outcome Measures     Row Name 09/01/21 1541          How much help from another is currently needed...    Putting on and taking off regular lower body clothing?  1  -AC     Bathing (including washing, rinsing, and drying)  2  -AC     Toileting (which includes using toilet bed pan or urinal)  1  -AC     Putting on and taking off regular upper body clothing  2  -AC     Taking care of personal grooming (such as brushing teeth)  4  -AC     Eating meals  4  -AC     AM-PAC 6 Clicks Score (OT)  14  -AC     Row Name 09/01/21 1400 09/01/21 1033       How much help from another person do you currently need...    Turning from your back to your side while in flat bed without using bedrails?  2  -CS  3  -CL    Moving from lying on back to sitting on the side of a flat bed without bedrails?  2  -CS  2  -CL    Moving to and from a bed to a chair (including a wheelchair)?  1  -CS  2  -CL    Standing up from a chair using your arms (e.g., wheelchair, bedside chair)?  2  -CS  2  -CL    Climbing 3-5 steps with a railing?  1  -CS  1  -CL    To walk in hospital room?  1  -CS  2  -CL    AM-PAC 6 Clicks Score (PT)  9  -CS  12  -CL    Row Name 09/01/21 1541 09/01/21 1400       Functional Assessment    Outcome Measure Options  AM-PAC 6 Clicks Daily Activity (OT);Optimal Instrument  -AC  AM-PAC 6 Clicks Basic Mobility (PT)  -CS    Row Name 09/01/21 1541          Optimal Instrument     Optimal Instrument  Optimal - 3  -AC     Bending/Stooping  4  -AC     Standing  4  -AC     Reaching  1  -AC     From the list, choose the 3 activities you would most like to be able to do without any difficulty  Bending/stooping;Standing;Reaching  -AC     Total Score Optimal - 3  9  -AC       User Key  (r) = Recorded By, (t) = Taken By, (c) = Cosigned By    Initials Name Provider Type    CL Jessica Vogt, RN Registered Nurse    Rina Khan OT Occupational Therapist    Martha Plaza, PT Physical Therapist          Occupational Therapy Education                 Title: PT OT SLP Therapies (In Progress)     Topic: Occupational Therapy (Done)     Point: ADL training (Done)     Description:   Instruct learner(s) on proper safety adaptation and remediation techniques during self care or transfers.   Instruct in proper use of assistive devices.              Learning Progress Summary           Patient Acceptance, E, VU by  at 9/1/2021 1543                   Point: Home exercise program (Done)     Description:   Instruct learner(s) on appropriate technique for monitoring, assisting and/or progressing therapeutic exercises/activities.              Learning Progress Summary           Patient Acceptance, E, VU by  at 9/1/2021 1543                   Point: Precautions (Done)     Description:   Instruct learner(s) on prescribed precautions during self-care and functional transfers.              Learning Progress Summary           Patient Acceptance, E, VU by  at 9/1/2021 1543                   Point: Body mechanics (Done)     Description:   Instruct learner(s) on proper positioning and spine alignment during self-care, functional mobility activities and/or exercises.              Learning Progress Summary           Patient Acceptance, E, VU by  at 9/1/2021 1543                               User Key     Initials Effective Dates Name Provider Type [facility] 06/16/21 -  Rina Dick OT Occupational  Therapist OT              OT Recommendation and Plan  Planned Therapy Interventions (OT): activity tolerance training, BADL retraining, functional balance retraining, occupation/activity based interventions, patient/caregiver education/training, transfer/mobility retraining, ROM/therapeutic exercise  Therapy Frequency (OT): 5 times/wk  Plan of Care Review  Plan of Care Reviewed With: patient  Progress: no change  Outcome Summary: Patient presents with limitations that impede his/her ability to perform ADLS. The skills of a therapist are necessary to maximize independence with ADLs.     Time Calculation:   Time Calculation- OT     Row Name 09/01/21 1545             Time Calculation- OT    OT Received On  09/01/21  -      OT Goal Re-Cert Due Date  09/10/21  -AC         Untimed Charges    OT Eval/Re-eval Minutes  35  -AC         Total Minutes    Untimed Charges Total Minutes  35  -AC       Total Minutes  35  -AC        User Key  (r) = Recorded By, (t) = Taken By, (c) = Cosigned By    Initials Name Provider Type    AC Rina Dick OT Occupational Therapist        Therapy Charges for Today     Code Description Service Date Service Provider Modifiers Qty    [id number] HC OT EVAL LOW COMPLEXITY 3 9/1/2021 Rina Dick OT GO 1               Rina Dick OT  9/1/2021

## 2021-09-01 NOTE — PLAN OF CARE
Goal Outcome Evaluation:  Plan of Care Reviewed With: patient        Progress: no change   Patient is tolerated PD, is passing flatus, will continue to monitor. Felicity Mcconnell RN    Patient was assessed by behavioral health intake, continues to deny any suicidal or homicidal ideation, given outpatient resources. Please see behavioral health notes.     Malka Craft MD  11/06/17 [id number]

## 2021-09-01 NOTE — PLAN OF CARE
Goal Outcome Evaluation:           Progress: improving  Outcome Summary: Patient complained of nausea this am which was relieved with zofran. Patient has had no further complaints and no new signs and symptoms noted. Will monitor.

## 2021-09-02 ENCOUNTER — READMISSION MANAGEMENT (OUTPATIENT)
Dept: CALL CENTER | Facility: HOSPITAL | Age: 86
End: 2021-09-02

## 2021-09-02 VITALS
OXYGEN SATURATION: 96 % | DIASTOLIC BLOOD PRESSURE: 70 MMHG | WEIGHT: 220.46 LBS | HEART RATE: 101 BPM | SYSTOLIC BLOOD PRESSURE: 104 MMHG | HEIGHT: 74 IN | RESPIRATION RATE: 16 BRPM | BODY MASS INDEX: 28.29 KG/M2 | TEMPERATURE: 98.1 F

## 2021-09-02 PROCEDURE — 97110 THERAPEUTIC EXERCISES: CPT

## 2021-09-02 PROCEDURE — 25010000002 HEPARIN (PORCINE) PER 1000 UNITS: Performed by: INTERNAL MEDICINE

## 2021-09-02 PROCEDURE — 25010000002 DEXAMETHASONE PER 1 MG: Performed by: INTERNAL MEDICINE

## 2021-09-02 PROCEDURE — U0003 INFECTIOUS AGENT DETECTION BY NUCLEIC ACID (DNA OR RNA); SEVERE ACUTE RESPIRATORY SYNDROME CORONAVIRUS 2 (SARS-COV-2) (CORONAVIRUS DISEASE [COVID-19]), AMPLIFIED PROBE TECHNIQUE, MAKING USE OF HIGH THROUGHPUT TECHNOLOGIES AS DESCRIBED BY CMS-2020-01-R: HCPCS | Performed by: INTERNAL MEDICINE

## 2021-09-02 RX ADMIN — SODIUM CHLORIDE, SODIUM LACTATE, CALCIUM CHLORIDE, MAGNESIUM CHLORIDE AND DEXTROSE: 1.5; 538; 448; 18.3; 5.08 INJECTION, SOLUTION INTRAPERITONEAL at 09:37

## 2021-09-02 RX ADMIN — SODIUM CHLORIDE, SODIUM LACTATE, CALCIUM CHLORIDE, MAGNESIUM CHLORIDE AND DEXTROSE: 1.5; 538; 448; 18.3; 5.08 INJECTION, SOLUTION INTRAPERITONEAL at 15:05

## 2021-09-02 RX ADMIN — SODIUM CHLORIDE, PRESERVATIVE FREE 10 ML: 5 INJECTION INTRAVENOUS at 09:37

## 2021-09-02 RX ADMIN — FAMOTIDINE 40 MG: 20 TABLET, FILM COATED ORAL at 09:37

## 2021-09-02 RX ADMIN — MICONAZOLE NITRATE: 2 POWDER TOPICAL at 09:38

## 2021-09-02 RX ADMIN — DEXAMETHASONE SODIUM PHOSPHATE 8 MG: 10 INJECTION INTRAMUSCULAR; INTRAVENOUS at 09:37

## 2021-09-02 NOTE — SIGNIFICANT NOTE
09/02/21 1043   Plan   Final Discharge Disposition Code 06 - home with home health care   Final Note Pt discharging home today. Pt will contiue [facility]. SW notified C of discharge.

## 2021-09-02 NOTE — DISCHARGE SUMMARY
Date of Discharge:  9/2/2021    Discharge Diagnosis: COVID    Presenting Problem/History of Present Illness  Active Hospital Problems    Diagnosis  POA   • COVID-19 virus infection [U07.1]  Unknown   • Pneumonia of both lungs due to infectious organism [J18.9]  Yes   • Weakness [R53.1]  Yes   • ESRD (end stage renal disease) on dialysis (CMS/[facility]) [N18.6, Z99.2]  Not Applicable   • 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 (CMS/[facility]) [C61]  Yes   • Anemia [D64.9]  Yes   • Chronic obstructive pulmonary disease with acute lower respiratory infection (CMS/[facility]) [J44.0]  Yes      Resolved Hospital Problems   No resolved problems to display.          Hospital Course  Patient is a 85 y.o. male presented with a PMH of ESRD (PD), prostate cancer, history of multiple myeloma, COPD and obstructive sleep apnea has not been doing well over the last few months and was recently in the hospital after he was discharged from [facility].  Patient uses 1.5% fluid at home and has episodes of hypotension and dehydration and is encouraged to increase his PO intake.  His wife brought him to the emergency room because he was confused and weak,  He was found to be hypotensive and IV fluids was started.  Patient was swabbed for COVID and was positive and was started on Decadron and given IV Rocephin.  On current assessment, patient is on room air and breathing comfortably.  He denies SOA or wheezing.      Procedures Performed         Consults:   Consults     Date and Time Order Name Status Description    8/30/2021  6:10 PM Inpatient Nephrology Consult Completed     8/19/2021 12:12 AM General MD Inpatient Consult Completed     7/26/2021  4:57 PM Inpatient Cardiology Consult Completed     7/26/2021  4:46 PM Cardiology (on-call MD unless specified) Completed     7/26/2021  4:46 PM Nephrology  (on-call MD unless specified) Completed           Pertinent Test Results: labs: COVID    Condition on Discharge:  Stable and breathing at  baseline    Vital Signs  Temp:  [97.3 °F (36.3 °C)-98.1 °F (36.7 °C)] 97.4 °F (36.3 °C)  Heart Rate:  [105-113] 105  Resp:  [18-20] 20  BP: (106-130)/(70-86) 123/86    Physical Exam:     General Appearance:    Alert, cooperative, in no acute distress   Head:    Normocephalic, without obvious abnormality, atraumatic   Eyes:            Lids and lashes normal, conjunctivae and sclerae normal, no   icterus, no pallor, corneas clear, PERRLA   Ears:    Ears appear intact with no abnormalities noted   Throat:   No oral lesions, no thrush, oral mucosa moist   Neck:   No adenopathy, supple, trachea midline, no thyromegaly, no   carotid bruit, no JVD   Back:     No kyphosis present, no scoliosis present, no skin lesions,      erythema or scars, no tenderness to percussion or                   palpation,   range of motion normal   Lungs:     Clear to auscultation,respirations regular, even and                  unlabored    Heart:    Regular rhythm and normal rate, normal S1 and S2, no            murmur, no gallop, no rub, no click   Chest Wall:    No abnormalities observed   Abdomen:     Normal bowel sounds, no masses, no organomegaly, soft        non-tender, non-distended, no guarding, no rebound                tenderness   Rectal:     Deferred   Extremities:   Moves all extremities well, no edema, no cyanosis, no             redness   Pulses:   Pulses palpable and equal bilaterally   Skin:   No bleeding, bruising or rash   Lymph nodes:   No palpable adenopathy   Neurologic:   Cranial nerves 2 - 12 grossly intact, sensation intact, DTR       present and equal bilaterally       Discharge Disposition  [facility]Health [facility]    Discharge Medications     Discharge Medications      Continue These Medications      Instructions Start Date   amiodarone 200 MG tablet  Commonly known as: PACERONE   200 mg, Oral, Daily      calcitriol 0.5 MCG capsule  Commonly known as: ROCALTROL   0.5 mcg, Oral, Daily      calcium acetate 667 MG capsule  capsule  Commonly known as: PHOS BINDER)   1,334 mg, Oral, 3 Times Daily      cetirizine 10 MG tablet  Commonly known as: zyrTEC   10 mg, Oral, Daily      dilTIAZem  MG 24 hr capsule  Commonly known as: CARDIZEM CD   120 mg, Oral, Daily      docusate sodium 100 MG capsule  Commonly known as: COLACE   100 mg, Oral, Daily      finasteride 5 MG tablet  Commonly known as: PROSCAR   5 mg, Oral, Every Night at Bedtime      gabapentin 100 MG capsule  Commonly known as: NEURONTIN   100 mg, Oral, 2 Times Daily      levothyroxine 200 MCG tablet  Commonly known as: SYNTHROID, LEVOTHROID   200 mcg, Oral, Daily      metoprolol tartrate 25 MG tablet  Commonly known as: LOPRESSOR   12.5 mg, Oral, 3 Times Daily      midodrine 5 MG tablet  Commonly known as: PROAMATINE   10 mg, Oral, 2 Times Daily With Meals      montelukast 10 MG tablet  Commonly known as: SINGULAIR   10 mg, Oral, Daily      pantoprazole 40 MG EC tablet  Commonly known as: PROTONIX   40 mg, Oral, Daily      potassium chloride 10 MEQ CR capsule  Commonly known as: MICRO-K   10 mEq, Oral, 2 Times Daily      predniSONE 10 MG tablet  Commonly known as: DELTASONE   10 mg, Oral, Every Other Day, Pt takes Monday, Wed, Fri      Symbicort 160-4.5 MCG/ACT inhaler  Generic drug: budesonide-formoterol   2 puffs, Inhalation, 2 Times Daily - RT      Ventolin  (90 Base) MCG/ACT inhaler  Generic drug: albuterol sulfate HFA   1 puff, Inhalation, Every 6 Hours PRN      vitamin D3 125 MCG (5000 UT) capsule capsule   5,000 Units, Oral, Daily             Discharge Diet: resume normal diet with at least 40oz of fluid a day    Activity at Discharge: Resume normal activity and resume  services    Follow-up Appointments  Future Appointments   Date Time Provider Department Center   11/8/2021 10:00 AM [facility] CARD [facility] DEVICE CHECK [facility] CD [facility]AMP [facility]   11/8/2021 10:15 AM Christian Ocasio MD [facility]         Test Results Pending at Discharge  Pending Labs     Order  Current Status    Blood Culture - Blood, Arm, Left Preliminary result    Blood Culture - Blood, Arm, Left Preliminary result           AZAM Villanueva  09/02/21  10:41 EDT    Time: Discharge 30 min

## 2021-09-02 NOTE — PLAN OF CARE
Goal Outcome Evaluation:  Plan of Care Reviewed With: patient        Progress: improving   Patient tolerated PD this shift. Patient pulled out an IV this shift. Patient has been having confusion. Patient remains Afib low 100s-120. Will continue to monitor. Felicity Mcconnell RN

## 2021-09-02 NOTE — THERAPY TREATMENT NOTE
Patient Name: Cristhian Watts  : 1935    MRN: [id number]                              Today's Date: 2021       Admit Date: 2021    Visit Dx:     ICD-10-CM ICD-9-CM   1. Pneumonia of both lungs due to infectious organism, unspecified part of lung  J18.9 483.8   2. Suspected COVID-19 virus infection  Z20.822 V01.79   3. Weakness  R53.1 780.79   4. Difficulty walking  R26.2 719.7   5. Decreased activities of daily living (ADL)  Z78.9 V49.89     Patient Active Problem List   Diagnosis   • Acute bronchitis   • Anemia   • ESRD (end stage renal disease) on dialysis (CMS/[facility])   • Paroxysmal atrial fibrillation (CMS/[facility])   • Benign hypertensive heart disease without congestive heart failure   • 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 (CMS/[facility])   • Chronic obstructive pulmonary disease with acute lower respiratory infection (CMS/[facility])   • Congestive heart failure (CMS/[facility])   • Elevated prostate specific antigen (PSA)   • Encounter for immunization   • Essential hypertension   • Ex-smoker   • Gastroesophageal reflux disease   • Gout   • Hereditary and idiopathic neuropathy, unspecified   • Hypertrophy of prostate without urinary obstruction and other lower urinary tract symptoms (LUTS)   • Type 2 diabetes mellitus (CMS/[facility])   • Kidney failure   • Mild intermittent asthma   • Mixed hyperlipidemia   • Multiple myeloma in remission (CMS/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   • Obesity   • Obstructive sleep apnea   • Osteoporosis   • Overweight with body mass index (BMI) 25.0-29.9   • Pleural effusion due to bacterial infection   • Postnasal drip   • Proteinuria   • Shortness of breath   • Thyrotoxicosis   • Vitamin D deficiency   • Troponin level elevated   • Generalized weakness   • Presence of cardiac pacemaker   • Other specified hypothyroidism   • Weakness   • Autonomic neuropathy   • Pneumonia of both lungs due to infectious organism   • COVID-19 virus infection      Past Medical History:   Diagnosis Date   • Basal cell carcinoma     REMOVED FROM FACE   • CHF (congestive heart failure) (CMS/HCC)     LAST EPISODE 8/2020   • Condition not found     BILATERAL HEARING AIDS   • Condition not found     CARDIAC ABLIATION 3/2018, LEO KNIGHT   • CPAP (continuous positive airway pressure) dependence    • Diabetes (CMS/HCC)     TYPE 2, BS IN THE -110   • Fistula     LEFT ARM, TUES&SAT   • Hyperlipidemia    • Hypertension     CONTROLLED WITH MEDS   • Hypothyroidism    • Myeloma (CMS/[facility])     MULTIPLE, REMISSION SINCE 2008   • Pacemaker     2018, WATCHMAN DEVICE AFTER PACEMAKER   • Paroxysmal A-fib (CMS/[facility])    • Renal disease     END STAGE   • Skin cancer of nose    • Troponin level elevated 7/26/2021   • Tuberculin skin 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     NEG   • Uses nebulizer and inhaler at home    • Wears glasses      Past Surgical History:   Procedure Laterality Date   • ARTERIOVENOUS FISTULA      LEFT ARM   • CARDIOVERSION      IN MAY 2018   • CATARACT EXTRACTION      BOTH EYES   • HEMORRHOIDECTOMY     • OTHER SURGICAL HISTORY      DENTAL IMPLANT   • OTHER SURGICAL HISTORY      ABLASION 3/2018   • PACEMAKER IMPLANTATION       General Information     Row Name 09/02/21 1028          OT Time and Intention    Document Type  therapy note (daily note)  -AV     Mode of Treatment  individual therapy;occupational therapy  -AV     Row Name 09/02/21 1028          General Information    Existing Precautions/Restrictions  fall  -AV     Barriers to Rehab  none identified  -AV     Row Name 09/02/21 1028          Safety Issues, Functional Mobility    Impairments Affecting Function (Mobility)  balance;endurance/activity tolerance  -AV       User Key  (r) = Recorded By, (t) = Taken By, (c) = Cosigned By    Initials Name Provider Type    AV Parth Liu OT Occupational Therapist          Mobility/ADL's    No documentation.       Obj/Interventions     Row Name 09/02/21 1028          Shoulder  (Therapeutic Exercise)    Shoulder (Therapeutic Exercise)  AROM (active range of motion)  -AV     Shoulder AROM (Therapeutic Exercise)  bilateral;flexion;horizontal aBduction/aDduction  -AV     [facility] Name 09/02/21 1028          Elbow/Forearm (Therapeutic Exercise)    Elbow/Forearm (Therapeutic Exercise)  AROM (active range of motion)  -AV     Elbow/Forearm AROM (Therapeutic Exercise)  bilateral;flexion;extension;supination;pronation  -AV     [facility] Name 09/02/21 1028          Motor Skills    Motor Skills  therapeutic exercise Patient able to complete 5 exercises 1 set of 15 in high Laurent position.  -AV     [facility] Name 09/02/21 1028          Therapeutic Exercise    Therapeutic Exercise  shoulder;elbow/forearm  -AV       User Key  (r) = Recorded By, (t) = Taken By, (c) = Cosigned By    Initials Name Provider Type    Parth Collazo OT Occupational Therapist        Goals/Plan    No documentation.       Clinical Impression     Row Name 09/02/21 1029          Pain Scale: FACES Pre/Post-Treatment    Pain: FACES Scale, Pretreatment  0-->no hurt  -AV     Posttreatment Pain Rating  0-->no hurt  -AV     Row Name 09/02/21 1029          Plan of Care Review    Progress  no change  -AV     Outcome Summary  Patient able to complete 5 upper extremity AROM exercises in high Laurent position.  Room air/minimal cues/demonstration.  Continued OT needed to remediate/compensate for deficits to maximize independence.  -AV     [facility] Name 09/02/21 1029          Vital Signs    O2 Delivery Pre Treatment  room air  -AV     O2 Delivery Intra Treatment  room air  -AV     O2 Delivery Post Treatment  room air  -AV       User Key  (r) = Recorded By, (t) = Taken By, (c) = Cosigned By    Initials Name Provider Type    Parth Collazo OT Occupational Therapist        Outcome Measures     [facility] Name 09/02/21 1030          How much help from another is currently needed...    Putting on and taking off regular lower body clothing?  1  -AV     Bathing (including  washing, rinsing, and drying)  2  -AV     Toileting (which includes using toilet bed pan or urinal)  1  -AV     Putting on and taking off regular upper body clothing  2  -AV     Taking care of personal grooming (such as brushing teeth)  4  -AV     Eating meals  4  -AV     AM-PAC 6 Clicks Score (OT)  14  -AV     Row Name 09/02/21 1030          Optimal Instrument    Bending/Stooping  4  -AV     Standing  4  -AV     Reaching  1  -AV       User Key  (r) = Recorded By, (t) = Taken By, (c) = Cosigned By    Initials Name Provider Type    Parth Collazo OT Occupational Therapist          Occupational Therapy Education                 Title: PT OT SLP Therapies (In Progress)     Topic: Occupational Therapy (Done)     Point: ADL training (Done)     Description:   Instruct learner(s) on proper safety adaptation and remediation techniques during self care or transfers.   Instruct in proper use of assistive devices.              Learning Progress Summary           Patient Acceptance, E, VU by  at 9/1/2021 1543                   Point: Home exercise program (Done)     Description:   Instruct learner(s) on appropriate technique for monitoring, assisting and/or progressing therapeutic exercises/activities.              Learning Progress Summary           Patient Acceptance, E, VU by  at 9/1/2021 1543                   Point: Precautions (Done)     Description:   Instruct learner(s) on prescribed precautions during self-care and functional transfers.              Learning Progress Summary           Patient Acceptance, E, VU by  at 9/1/2021 1543                   Point: Body mechanics (Done)     Description:   Instruct learner(s) on proper positioning and spine alignment during self-care, functional mobility activities and/or exercises.              Learning Progress Summary           Patient Acceptance, E, VU by  at 9/1/2021 1543                               User Key     Initials Effective Dates Name Provider Type  Discipline    AC 06/16/21 -  Rina Dick OT Occupational Therapist OT              OT Recommendation and Plan     Plan of Care Review  Progress: no change  Outcome Summary: Patient able to complete 5 upper extremity AROM exercises in high Laurent position.  Room air/minimal cues/demonstration.  Continued OT needed to remediate/compensate for deficits to maximize independence.     Time Calculation:   Time Calculation- OT     Row Name 09/02/21 1031             Time Calculation- OT    OT Received On  09/02/21  -AV      OT Goal Re-Cert Due Date  09/10/21  -AV         Timed Charges    68059 - OT Therapeutic Exercise Minutes  10  -AV         Total Minutes    Timed Charges Total Minutes  10  -AV       Total Minutes  10  -AV        User Key  (r) = Recorded By, (t) = Taken By, (c) = Cosigned By    Initials Name Provider Type    AV Parth Liu OT Occupational Therapist        Therapy Charges for Today     Code Description Service Date Service Provider Modifiers Qty    [id number] HC OT THER PROC EA 15 MIN 9/2/2021 Parth Liu OT GO 1               Parth Liu OT  9/2/2021

## 2021-09-03 ENCOUNTER — READMISSION MANAGEMENT (OUTPATIENT)
Dept: CALL CENTER | Facility: HOSPITAL | Age: 86
End: 2021-09-03

## 2021-09-03 LAB — SARS-COV-2 RNA RESP QL NAA+PROBE: DETECTED

## 2021-09-03 NOTE — OUTREACH NOTE
Prep Survey      Responses   [facility]   Is LACE score < 7 ?  No   Emergency Room discharge w/ pulse ox?  No   Eligibility  Readm Mgmt   Discharge diagnosis  Covid 19   Does the patient have one of the following disease processes/diagnoses(primary or secondary)?  COVID-19   Does the patient have Home health ordered?  Yes   What is the Home health agency?   [facility]   Is there a DME ordered?  No   Prep survey completed?  Yes          Nitza Iniguez RN

## 2021-09-03 NOTE — OUTREACH NOTE
COVID-19 Week 1 Survey      Responses   [facility] patient discharged from?  [facility]   Does the patient have one of the following disease processes/diagnoses(primary or secondary)?  COVID-19   COVID-19 underlying condition?  COPD   Call Number  Call 1   Week 1 Call successful?  Yes   Call start time  1421   Call end time  1428   Discharge diagnosis  Covid 19   Is patient permission given to speak with other caregiver?  Yes   List who call center can speak with  wife   Person spoke with today (if not patient) and relationship  wife   Meds reviewed with patient/caregiver?  Yes   Is the patient having any side effects they believe may be caused by any medication additions or changes?  No   Does the patient have all medications ordered at discharge?  N/A   Is the patient taking all medications as directed (includes completed medication regime)?  Yes   Does the patient have a primary care provider?   Yes   Comments regarding PCP  Dr. Escobedo   Does the patient have an appointment with their PCP or specialist within 7 days of discharge?  No   What is preventing the patient from scheduling follow up appointments within 7 days of discharge?  Haven't had time   Nursing Interventions  Advised patient to make appointment, Educated patient on importance of making appointment   [facility]   Has home health visited the patient within 72 hours of discharge?  Call prior to 72 hours   Psychosocial issues?  No   Did the patient receive a copy of their discharge instructions?  Yes   Did the patient receive a copy of COVID-19 specific instructions?  Yes   Nursing interventions  Reviewed instructions with patient   What is the patient's perception of their health status since discharge?  Improving   Does the patient have any of the following symptoms?  Loss of taste/smell, Cough   Nursing Interventions  Nurse provided patient education   Pulse Ox monitoring  Intermittent   Pulse Ox device source   Patient   O2 Sat comments  Keep O2 above 92%   O2 Sat: education provided  Sat levels, Monitoring frequency, When to seek care   Is the patient/caregiver able to teach back steps to recovery at home?  Eat a well-balance diet, Rest and rebuild strength, gradually increase activity, Set small, achievable goals for return to baseline health, Make a list of questions for provider's appointment   If the patient is a current smoker, are they able to teach back resources for cessation?  Not a smoker   Is the patient/caregiver able to teach back the hierarchy of who to call/visit for symptoms/problems? PCP, Specialist, Home health nurse, Urgent Care, ED, 911  Yes   COVID-19 call completed?  Yes   Wrap up additional comments  quick call with wife, she is helping patient at this time          Valorie Monroy RN

## 2021-09-04 LAB
BACTERIA SPEC AEROBE CULT: NORMAL
BACTERIA SPEC AEROBE CULT: NORMAL

## 2021-09-05 ENCOUNTER — HOSPITAL ENCOUNTER (EMERGENCY)
Facility: HOSPITAL | Age: 86
Discharge: HOME | End: 2021-09-05
Attending: EMERGENCY MEDICINE | Admitting: EMERGENCY MEDICINE

## 2021-09-05 ENCOUNTER — APPOINTMENT (OUTPATIENT)
Dept: ULTRASOUND IMAGING | Facility: HOSPITAL | Age: 86
End: 2021-09-05

## 2021-09-05 VITALS
BODY MASS INDEX: 28.29 KG/M2 | OXYGEN SATURATION: 98 % | DIASTOLIC BLOOD PRESSURE: 57 MMHG | SYSTOLIC BLOOD PRESSURE: 91 MMHG | RESPIRATION RATE: 17 BRPM | HEIGHT: 74 IN | WEIGHT: 220.46 LBS | HEART RATE: 87 BPM | TEMPERATURE: 97.7 F

## 2021-09-05 DIAGNOSIS — N50.812 PAIN IN BOTH TESTICLES: Primary | ICD-10-CM

## 2021-09-05 DIAGNOSIS — N50.811 PAIN IN BOTH TESTICLES: Primary | ICD-10-CM

## 2021-09-05 DIAGNOSIS — Z99.2 CHRONIC KIDNEY DISEASE WITH END STAGE RENAL FAILURE ON DIALYSIS (HCC): ICD-10-CM

## 2021-09-05 DIAGNOSIS — N18.6 CHRONIC KIDNEY DISEASE WITH END STAGE RENAL FAILURE ON DIALYSIS (HCC): ICD-10-CM

## 2021-09-05 LAB
ALBUMIN SERPL-MCNC: 2.4 G/DL (ref 3.5–5.2)
ALBUMIN/GLOB SERPL: 0.5 G/DL
ALP SERPL-CCNC: 549 U/L (ref 39–117)
ALT SERPL W P-5'-P-CCNC: 64 U/L (ref 1–41)
ANION GAP SERPL CALCULATED.3IONS-SCNC: 13.9 MMOL/L (ref 5–15)
AST SERPL-CCNC: 129 U/L (ref 1–40)
BASOPHILS # BLD AUTO: 0.01 10*3/MM3 (ref 0–0.2)
BASOPHILS NFR BLD AUTO: 0.1 % (ref 0–1.5)
BILIRUB SERPL-MCNC: 0.4 MG/DL (ref 0–1.2)
BUN SERPL-MCNC: 39 MG/DL (ref 8–23)
BUN/CREAT SERPL: 5.7 (ref 7–25)
CALCIUM SPEC-SCNC: 8.7 MG/DL (ref 8.6–10.5)
CHLORIDE SERPL-SCNC: 92 MMOL/L (ref 98–107)
CO2 SERPL-SCNC: 25.1 MMOL/L (ref 22–29)
CREAT SERPL-MCNC: 6.87 MG/DL (ref 0.76–1.27)
D-LACTATE SERPL-SCNC: 2.9 MMOL/L (ref 0.5–2)
D-LACTATE SERPL-SCNC: 3.5 MMOL/L (ref 0.5–2)
DEPRECATED RDW RBC AUTO: 53.3 FL (ref 37–54)
EOSINOPHIL # BLD AUTO: 0.05 10*3/MM3 (ref 0–0.4)
EOSINOPHIL NFR BLD AUTO: 0.5 % (ref 0.3–6.2)
ERYTHROCYTE [DISTWIDTH] IN BLOOD BY AUTOMATED COUNT: 14.6 % (ref 12.3–15.4)
GFR SERPL CREATININE-BSD FRML MDRD: 8 ML/MIN/1.73
GFR SERPL CREATININE-BSD FRML MDRD: ABNORMAL ML/MIN/{1.73_M2}
GLOBULIN UR ELPH-MCNC: 4.5 GM/DL
GLUCOSE SERPL-MCNC: 137 MG/DL (ref 65–99)
HCT VFR BLD AUTO: 34.8 % (ref 37.5–51)
HGB BLD-MCNC: 11.4 G/DL (ref 13–17.7)
HOLD SPECIMEN: NORMAL
HOLD SPECIMEN: NORMAL
IMM GRANULOCYTES # BLD AUTO: 0.42 10*3/MM3 (ref 0–0.05)
IMM GRANULOCYTES NFR BLD AUTO: 4 % (ref 0–0.5)
LIPASE SERPL-CCNC: 90 U/L (ref 13–60)
LYMPHOCYTES # BLD AUTO: 1.04 10*3/MM3 (ref 0.7–3.1)
LYMPHOCYTES NFR BLD AUTO: 9.8 % (ref 19.6–45.3)
MCH RBC QN AUTO: 33.4 PG (ref 26.6–33)
MCHC RBC AUTO-ENTMCNC: 32.8 G/DL (ref 31.5–35.7)
MCV RBC AUTO: 102.1 FL (ref 79–97)
MONOCYTES # BLD AUTO: 0.58 10*3/MM3 (ref 0.1–0.9)
MONOCYTES NFR BLD AUTO: 5.5 % (ref 5–12)
NEUTROPHILS NFR BLD AUTO: 8.5 10*3/MM3 (ref 1.7–7)
NEUTROPHILS NFR BLD AUTO: 80.1 % (ref 42.7–76)
NRBC BLD AUTO-RTO: 0 /100 WBC (ref 0–0.2)
PLATELET # BLD AUTO: 297 10*3/MM3 (ref 140–450)
PMV BLD AUTO: 9.8 FL (ref 6–12)
POTASSIUM SERPL-SCNC: 4.1 MMOL/L (ref 3.5–5.2)
PROT SERPL-MCNC: 6.9 G/DL (ref 6–8.5)
RBC # BLD AUTO: 3.41 10*6/MM3 (ref 4.14–5.8)
SODIUM SERPL-SCNC: 131 MMOL/L (ref 136–145)
WBC # BLD AUTO: 10.6 10*3/MM3 (ref 3.4–10.8)
WHOLE BLOOD HOLD SPECIMEN: NORMAL
WHOLE BLOOD HOLD SPECIMEN: NORMAL

## 2021-09-05 PROCEDURE — 83690 ASSAY OF LIPASE: CPT | Performed by: EMERGENCY MEDICINE

## 2021-09-05 PROCEDURE — 76870 US EXAM SCROTUM: CPT

## 2021-09-05 PROCEDURE — 83605 ASSAY OF LACTIC ACID: CPT | Performed by: EMERGENCY MEDICINE

## 2021-09-05 PROCEDURE — 96374 THER/PROPH/DIAG INJ IV PUSH: CPT

## 2021-09-05 PROCEDURE — 96376 TX/PRO/DX INJ SAME DRUG ADON: CPT

## 2021-09-05 PROCEDURE — 80053 COMPREHEN METABOLIC PANEL: CPT | Performed by: EMERGENCY MEDICINE

## 2021-09-05 PROCEDURE — 85025 COMPLETE CBC W/AUTO DIFF WBC: CPT | Performed by: EMERGENCY MEDICINE

## 2021-09-05 PROCEDURE — 25010000002 MORPHINE PER 10 MG: Performed by: EMERGENCY MEDICINE

## 2021-09-05 PROCEDURE — 99284 EMERGENCY DEPT VISIT MOD MDM: CPT

## 2021-09-05 PROCEDURE — 87040 BLOOD CULTURE FOR BACTERIA: CPT | Performed by: EMERGENCY MEDICINE

## 2021-09-05 RX ORDER — MORPHINE SULFATE 2 MG/ML
2 INJECTION, SOLUTION INTRAMUSCULAR; INTRAVENOUS ONCE
Status: COMPLETED | OUTPATIENT
Start: 2021-09-05 | End: 2021-09-05

## 2021-09-05 RX ORDER — HYDROCODONE BITARTRATE AND ACETAMINOPHEN 5; 325 MG/1; MG/1
1 TABLET ORAL EVERY 6 HOURS PRN
Qty: 10 TABLET | Refills: 0 | Status: SHIPPED | OUTPATIENT
Start: 2021-09-05 | End: 2021-09-05 | Stop reason: ALTCHOICE

## 2021-09-05 RX ORDER — HYDROCODONE BITARTRATE AND ACETAMINOPHEN 5; 325 MG/1; MG/1
1 TABLET ORAL EVERY 6 HOURS PRN
Qty: 15 TABLET | Refills: 0 | Status: SHIPPED | OUTPATIENT
Start: 2021-09-05

## 2021-09-05 RX ORDER — HYDROCODONE BITARTRATE AND ACETAMINOPHEN 5; 325 MG/1; MG/1
1 TABLET ORAL EVERY 6 HOURS PRN
Qty: 15 TABLET | Refills: 0 | Status: SHIPPED | OUTPATIENT
Start: 2021-09-05 | End: 2021-09-05 | Stop reason: SDUPTHER

## 2021-09-05 RX ORDER — SODIUM CHLORIDE 0.9 % (FLUSH) 0.9 %
10 SYRINGE (ML) INJECTION AS NEEDED
Status: DISCONTINUED | OUTPATIENT
Start: 2021-09-05 | End: 2021-09-05 | Stop reason: HOSPADM

## 2021-09-05 RX ADMIN — MORPHINE SULFATE 4 MG: 4 INJECTION, SOLUTION INTRAMUSCULAR; INTRAVENOUS at 06:35

## 2021-09-05 RX ADMIN — SODIUM CHLORIDE 500 ML: 9 INJECTION, SOLUTION INTRAVENOUS at 09:54

## 2021-09-05 RX ADMIN — MORPHINE SULFATE 2 MG: 2 INJECTION, SOLUTION INTRAMUSCULAR; INTRAVENOUS at 15:37

## 2021-09-05 NOTE — ED PROVIDER NOTES
Subjective   Cristhian Watts is a COVID+ 86 y.o. male who presents to ED via EMS accompanied by family c/o TESTICLE PAIN. This started yesterday and is still present and constant. It was gradual in onset and described as moderate in severity. Nothing improves or worsens symptoms.     The pain radiated down BLE. He denies abdominal pain, nausea, emesis, or diarrhea. Pt has taken ibuprofen and hydrocodone with no relief.     Pt has hx of prostate CA. He is a peritoneal dialysis pt. He notes that when he was last dialyzed, the fluid appeared normal and cleared.       History provided by:  Patient      Review of Systems   Constitutional: Negative for chills, diaphoresis and fever.   HENT: Negative for ear discharge and nosebleeds.    Eyes: Negative for photophobia.   Respiratory: Negative for shortness of breath.    Cardiovascular: Negative for chest pain.   Gastrointestinal: Negative for diarrhea, nausea and vomiting.   Genitourinary: Positive for testicular pain. Negative for dysuria.   Musculoskeletal: Negative for back pain and neck pain.   Skin: Negative for rash.   Neurological: Negative for headaches.   All other systems reviewed and are negative.      Past Medical History:   Diagnosis Date   • Basal cell carcinoma     REMOVED FROM FACE   • CHF (congestive heart failure) (CMS/HCC)     LAST EPISODE 8/2020   • Condition not found     BILATERAL HEARING AIDS   • Condition not found     CARDIAC ABLIATION 3/2018, FOLLOWS TYRA KNIGHT   • CPAP (continuous positive airway pressure) dependence    • Diabetes (CMS/HCC)     TYPE 2, BS IN THE -110   • Fistula     LEFT ARM, TUES&SAT   • Hyperlipidemia    • Hypertension     CONTROLLED WITH MEDS   • Hypothyroidism    • Myeloma (CMS/HCC)     MULTIPLE, REMISSION SINCE 2008   • Pacemaker     2018, WATCHMAN DEVICE AFTER PACEMAKER   • Paroxysmal A-fib (CMS/HCC)    • Renal disease     END STAGE   • Skin cancer of nose    • Troponin level elevated 7/26/2021   • Tuberculin skin  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     NEG   • Uses nebulizer and inhaler at home    • Wears glasses        Allergies   Allergen Reactions   • Oxycodone Other (See Comments)     DOESN'T LIKE FEELING    • Renagel [Sevelamer] Unknown - Low Severity       Past Surgical History:   Procedure Laterality Date   • ARTERIOVENOUS FISTULA      LEFT ARM   • CARDIOVERSION      IN MAY 2018   • CATARACT EXTRACTION      BOTH EYES   • HEMORRHOIDECTOMY     • OTHER SURGICAL HISTORY      DENTAL IMPLANT   • OTHER SURGICAL HISTORY      ABLASION 3/2018   • PACEMAKER IMPLANTATION         History reviewed. No pertinent family history.    Social History     Socioeconomic History   • Marital status:      Spouse name: Not on file   • Number of children: Not on file   • Years of education: Not on file   • Highest education level: Not on file   Tobacco Use   • Smoking status: Former Smoker     Quit date: 1980     Years since quittin.1   • Smokeless tobacco: Former User     Quit date: 1992   • Tobacco comment: smoked cigars on occasion   Vaping Use   • Vaping Use: Never used   Substance and Sexual Activity   • Alcohol use: Not Currently   • Drug use: Never   • Sexual activity: Defer         Objective   Physical Exam  Vitals and nursing note reviewed.   Constitutional:       General: He is not in acute distress.     Appearance: Normal appearance.   HENT:      Head: Normocephalic and atraumatic.      Nose: Nose normal.   Eyes:      General: No scleral icterus.  Cardiovascular:      Rate and Rhythm: Normal rate and regular rhythm.      Heart sounds: Normal heart sounds.   Pulmonary:      Effort: Pulmonary effort is normal. No respiratory distress.      Breath sounds: Normal breath sounds.   Abdominal:      Palpations: Abdomen is soft.      Tenderness: There is no abdominal tenderness.      Comments: Peritoneal dialysis catheter.    Genitourinary:     Comments: Mild, diffuse scrotal tenderness. No edema, erythema, or masses.   Musculoskeletal:          General: Normal range of motion.      Cervical back: Neck supple.      Right lower leg: No edema.      Left lower leg: No edema.   Skin:     General: Skin is warm and dry.   Neurological:      General: No focal deficit present.      Mental Status: He is alert and oriented to person, place, and time.      Sensory: No sensory deficit.      Motor: No weakness.         Procedures         ED Course        The patient was seen evaluated ED by me.  The above history and physical examination was performed as stated above.  The diagnostic data is obtained.  Results reviewed.  Patient's ultrasound was negative.  Patient's pain was relieved with the IV morphine.  Patient was given 500 mL fluids prior to discharge.  Patient has recent diagnosed with Covid and possibly his pain is secondary to myalgias from that.  Patient is on peritoneal dialysis and his peritoneal fluid was reported clear by both him and his sister.  There is no signs to suggest this is peritonitis.  Patient will be given a few pain pills for home and he is to have outpatient follow-up with his physicians.                                    MDM    Final diagnoses:   Pain in both testicles   Chronic kidney disease with end stage renal failure on dialysis (CMS/[facility])       Documentation assistance provided by william De Jesus.  Information recorded by the william was done at my direction and has been verified and validated by me.     Payton De Jeuss  09/05/21 0624       Payton De Jesus  09/05/21 0626       Hema Burns DO  09/05/21 0977

## 2021-09-05 NOTE — DISCHARGE INSTRUCTIONS
Resume home medications.  Continue on current dialysis.  Follow-up with your primary care provider in 1 week if not better.  Return to the ER for any change or worsening of symptoms as well.

## 2021-09-06 ENCOUNTER — READMISSION MANAGEMENT (OUTPATIENT)
Dept: CALL CENTER | Facility: HOSPITAL | Age: 86
End: 2021-09-06

## 2021-09-06 NOTE — OUTREACH NOTE
COVID-19 Week 1 Survey      Responses   [facility] patient discharged from?  [facility]   Does the patient have one of the following disease processes/diagnoses(primary or secondary)?  COVID-19   COVID-19 underlying condition?  COPD   Call Number  Call 4   Week 1 Call successful?  Yes   Call start time  1223   Call end time  1230   Is patient permission given to speak with other caregiver?  Yes   Person spoke with today (if not patient) and relationship  wife   Meds reviewed with patient/caregiver?  Yes   Is the patient having any side effects they believe may be caused by any medication additions or changes?  No   Does the patient have all medications ordered at discharge?  N/A   Is the patient taking all medications as directed (includes completed medication regime)?  Yes   Does the patient have a primary care provider?   Yes   Does the patient have an appointment with their PCP or specialist within 7 days of discharge?  No   What is preventing the patient from scheduling follow up appointments within 7 days of discharge?  Haven't had time   Nursing Interventions  Advised patient to make appointment, Educated patient on importance of making appointment   Has the patient kept scheduled appointments due by today?  N/A   Has home health visited the patient within 72 hours of discharge?  Yes   Home health comments  Wife states HH covid nurse has visited-will visit on Tues. and Thurs.   Psychosocial issues?  No   What is the patient's perception of their health status since discharge?  Same   Does the patient have any of the following symptoms?  Cough, Loss of taste/smell   Nursing Interventions  Nurse provided patient education   Pulse Ox monitoring  Intermittent   Pulse Ox device source  Patient   O2 Sat comments  96-97% on RA.   Is the patient/caregiver able to teach back the hierarchy of who to call/visit for symptoms/problems? PCP, Specialist, Home health nurse, Urgent Care, ED, 911  Yes   COVID-19 call  completed?  Yes   Wrap up additional comments  Wife states is about the same-continues to c/o scrotal pain. States no edema or discoloration noted. States will contact HH nurse if any worsening. Denies any needs today.          Antonieta Ny RN

## 2021-09-07 LAB — QT INTERVAL: 410 MS

## 2021-09-09 ENCOUNTER — READMISSION MANAGEMENT (OUTPATIENT)
Dept: CALL CENTER | Facility: HOSPITAL | Age: 86
End: 2021-09-09

## 2021-09-09 NOTE — OUTREACH NOTE
COVID-19 Week 2 Survey      Responses   [facility] patient discharged from?  [facility]   Does the patient have one of the following disease processes/diagnoses(primary or secondary)?  COVID-19   COVID-19 underlying condition?  COPD   Call Number  Call 1   COVID-19 Week 2: Call 1 attempt successful?  Yes   Call start time  0812   Call end time  0816   Discharge diagnosis  Covid 19   COVID-19 call completed?  Yes   Revoked  No further contact(revokes)-requires comment   Is the patient interested in additional calls from an ambulatory ?  NOTE:  applies to high risk patients requiring additional follow-up.  No   Graduated/Revoked comments  Spouse reports on this day that the pt will be entering hospice at this time. No further calls are needed.          Shantal Quezada RN

## 2021-09-10 LAB
BACTERIA SPEC AEROBE CULT: NORMAL
BACTERIA SPEC AEROBE CULT: NORMAL

## 2023-08-06 NOTE — OUTREACH NOTE
Prep Survey      Responses   [facility]   Is LACE score < 7 ?  No   Emergency Room discharge w/ pulse ox?  No   Eligibility  Not Eligible   Does the patient have one of the following disease processes/diagnoses(primary or secondary)?  Other   Prep survey completed?  Yes          Kisha Mcconnell RN         1 Principal Discharge DX:	Dark urine  Secondary Diagnosis:	Back pain

## 2024-09-12 NOTE — OUTREACH NOTE
Prep Survey      Responses   [facility]   Is LACE score < 7 ?  No   Emergency Room discharge w/ pulse ox?  No   Eligibility  Not Eligible   What are the reasons patient is not eligible?  [facility]   Does the patient have one of the following disease processes/diagnoses(primary or secondary)?  Acute MI (STEMI,NSTEMI)   Prep survey completed?  Yes          Danica Lopez RN         No, not prescribed...